# Patient Record
Sex: FEMALE | NOT HISPANIC OR LATINO | Employment: STUDENT | URBAN - METROPOLITAN AREA
[De-identification: names, ages, dates, MRNs, and addresses within clinical notes are randomized per-mention and may not be internally consistent; named-entity substitution may affect disease eponyms.]

---

## 2023-12-09 ENCOUNTER — HOSPITAL ENCOUNTER (INPATIENT)
Facility: CLINIC | Age: 28
Setting detail: SURGERY ADMIT
End: 2023-12-09
Attending: DENTIST | Admitting: DENTIST
Payer: COMMERCIAL

## 2023-12-09 ENCOUNTER — ANESTHESIA EVENT (OUTPATIENT)
Dept: SURGERY | Facility: CLINIC | Age: 28
End: 2023-12-09
Payer: COMMERCIAL

## 2023-12-09 ENCOUNTER — HOSPITAL ENCOUNTER (INPATIENT)
Facility: CLINIC | Age: 28
LOS: 2 days | Discharge: HOME | End: 2023-12-11
Attending: EMERGENCY MEDICINE | Admitting: SURGERY
Payer: COMMERCIAL

## 2023-12-09 ENCOUNTER — APPOINTMENT (OUTPATIENT)
Dept: CT IMAGING | Facility: CLINIC | Age: 28
End: 2023-12-09
Attending: EMERGENCY MEDICINE
Payer: COMMERCIAL

## 2023-12-09 DIAGNOSIS — S01.81XA CHIN LACERATION, INITIAL ENCOUNTER: ICD-10-CM

## 2023-12-09 DIAGNOSIS — S02.66XB OPEN FRACTURE OF SYMPHYSIS OF BODY OF MANDIBLE, INITIAL ENCOUNTER (H): Primary | ICD-10-CM

## 2023-12-09 PROBLEM — S02.609A MANDIBLE FRACTURE (H): Status: ACTIVE | Noted: 2023-12-09

## 2023-12-09 LAB
ABO/RH(D): NORMAL
ALBUMIN SERPL BCG-MCNC: 3.9 G/DL (ref 3.5–5.2)
ALP SERPL-CCNC: 45 U/L (ref 40–150)
ALT SERPL W P-5'-P-CCNC: 13 U/L (ref 0–50)
ANION GAP SERPL CALCULATED.3IONS-SCNC: 11 MMOL/L (ref 7–15)
ANTIBODY SCREEN: NEGATIVE
AST SERPL W P-5'-P-CCNC: 18 U/L (ref 0–45)
ATRIAL RATE - MUSE: 65 BPM
BASOPHILS # BLD AUTO: 0 10E3/UL (ref 0–0.2)
BASOPHILS NFR BLD AUTO: 1 %
BILIRUB SERPL-MCNC: 0.3 MG/DL
BUN SERPL-MCNC: 10.1 MG/DL (ref 6–20)
CALCIUM SERPL-MCNC: 8.6 MG/DL (ref 8.6–10)
CHLORIDE SERPL-SCNC: 106 MMOL/L (ref 98–107)
CREAT SERPL-MCNC: 0.99 MG/DL (ref 0.51–0.95)
DEPRECATED HCO3 PLAS-SCNC: 22 MMOL/L (ref 22–29)
DIASTOLIC BLOOD PRESSURE - MUSE: NORMAL MMHG
EGFRCR SERPLBLD CKD-EPI 2021: 79 ML/MIN/1.73M2
EOSINOPHIL # BLD AUTO: 0.2 10E3/UL (ref 0–0.7)
EOSINOPHIL NFR BLD AUTO: 3 %
ERYTHROCYTE [DISTWIDTH] IN BLOOD BY AUTOMATED COUNT: 11.8 % (ref 10–15)
GLUCOSE SERPL-MCNC: 108 MG/DL (ref 70–99)
HCT VFR BLD AUTO: 41.4 % (ref 35–47)
HGB BLD-MCNC: 14.5 G/DL (ref 11.7–15.7)
IMM GRANULOCYTES # BLD: 0 10E3/UL
IMM GRANULOCYTES NFR BLD: 0 %
INTERPRETATION ECG - MUSE: NORMAL
LYMPHOCYTES # BLD AUTO: 3.3 10E3/UL (ref 0.8–5.3)
LYMPHOCYTES NFR BLD AUTO: 54 %
MCH RBC QN AUTO: 30.8 PG (ref 26.5–33)
MCHC RBC AUTO-ENTMCNC: 35 G/DL (ref 31.5–36.5)
MCV RBC AUTO: 88 FL (ref 78–100)
MONOCYTES # BLD AUTO: 0.4 10E3/UL (ref 0–1.3)
MONOCYTES NFR BLD AUTO: 6 %
NEUTROPHILS # BLD AUTO: 2.2 10E3/UL (ref 1.6–8.3)
NEUTROPHILS NFR BLD AUTO: 36 %
NRBC # BLD AUTO: 0 10E3/UL
NRBC BLD AUTO-RTO: 0 /100
P AXIS - MUSE: 31 DEGREES
PLATELET # BLD AUTO: 267 10E3/UL (ref 150–450)
POTASSIUM SERPL-SCNC: 4.1 MMOL/L (ref 3.4–5.3)
PR INTERVAL - MUSE: 140 MS
PROT SERPL-MCNC: 6.8 G/DL (ref 6.4–8.3)
QRS DURATION - MUSE: 90 MS
QT - MUSE: 416 MS
QTC - MUSE: 432 MS
R AXIS - MUSE: 77 DEGREES
RBC # BLD AUTO: 4.71 10E6/UL (ref 3.8–5.2)
SODIUM SERPL-SCNC: 139 MMOL/L (ref 135–145)
SPECIMEN EXPIRATION DATE: NORMAL
SYSTOLIC BLOOD PRESSURE - MUSE: NORMAL MMHG
T AXIS - MUSE: 60 DEGREES
VENTRICULAR RATE- MUSE: 65 BPM
WBC # BLD AUTO: 6.2 10E3/UL (ref 4–11)

## 2023-12-09 PROCEDURE — 70450 CT HEAD/BRAIN W/O DYE: CPT | Mod: 26 | Performed by: RADIOLOGY

## 2023-12-09 PROCEDURE — 96375 TX/PRO/DX INJ NEW DRUG ADDON: CPT

## 2023-12-09 PROCEDURE — 0HQ1XZZ REPAIR FACE SKIN, EXTERNAL APPROACH: ICD-10-PCS | Performed by: DENTIST

## 2023-12-09 PROCEDURE — 120N000002 HC R&B MED SURG/OB UMMC

## 2023-12-09 PROCEDURE — 70486 CT MAXILLOFACIAL W/O DYE: CPT | Mod: 26 | Performed by: RADIOLOGY

## 2023-12-09 PROCEDURE — 70450 CT HEAD/BRAIN W/O DYE: CPT

## 2023-12-09 PROCEDURE — 93005 ELECTROCARDIOGRAM TRACING: CPT

## 2023-12-09 PROCEDURE — 85025 COMPLETE CBC W/AUTO DIFF WBC: CPT | Performed by: EMERGENCY MEDICINE

## 2023-12-09 PROCEDURE — 250N000011 HC RX IP 250 OP 636: Performed by: EMERGENCY MEDICINE

## 2023-12-09 PROCEDURE — 36415 COLL VENOUS BLD VENIPUNCTURE: CPT | Performed by: EMERGENCY MEDICINE

## 2023-12-09 PROCEDURE — 93010 ELECTROCARDIOGRAM REPORT: CPT | Performed by: EMERGENCY MEDICINE

## 2023-12-09 PROCEDURE — 250N000013 HC RX MED GY IP 250 OP 250 PS 637: Performed by: PHYSICIAN ASSISTANT

## 2023-12-09 PROCEDURE — 99285 EMERGENCY DEPT VISIT HI MDM: CPT | Mod: 25

## 2023-12-09 PROCEDURE — 250N000011 HC RX IP 250 OP 636: Performed by: PHYSICIAN ASSISTANT

## 2023-12-09 PROCEDURE — 70486 CT MAXILLOFACIAL W/O DYE: CPT

## 2023-12-09 PROCEDURE — 86850 RBC ANTIBODY SCREEN: CPT | Performed by: EMERGENCY MEDICINE

## 2023-12-09 PROCEDURE — 258N000003 HC RX IP 258 OP 636: Performed by: EMERGENCY MEDICINE

## 2023-12-09 PROCEDURE — 96361 HYDRATE IV INFUSION ADD-ON: CPT

## 2023-12-09 PROCEDURE — 86901 BLOOD TYPING SEROLOGIC RH(D): CPT | Performed by: EMERGENCY MEDICINE

## 2023-12-09 PROCEDURE — 72125 CT NECK SPINE W/O DYE: CPT | Mod: 26 | Performed by: RADIOLOGY

## 2023-12-09 PROCEDURE — 80053 COMPREHEN METABOLIC PANEL: CPT | Performed by: EMERGENCY MEDICINE

## 2023-12-09 PROCEDURE — 72125 CT NECK SPINE W/O DYE: CPT

## 2023-12-09 PROCEDURE — 96365 THER/PROPH/DIAG IV INF INIT: CPT

## 2023-12-09 PROCEDURE — 96376 TX/PRO/DX INJ SAME DRUG ADON: CPT

## 2023-12-09 PROCEDURE — 99285 EMERGENCY DEPT VISIT HI MDM: CPT | Mod: 25 | Performed by: EMERGENCY MEDICINE

## 2023-12-09 PROCEDURE — 99222 1ST HOSP IP/OBS MODERATE 55: CPT | Performed by: PHYSICIAN ASSISTANT

## 2023-12-09 RX ORDER — ACETAMINOPHEN 325 MG/1
975 TABLET ORAL 3 TIMES DAILY
Status: DISCONTINUED | OUTPATIENT
Start: 2023-12-09 | End: 2023-12-10

## 2023-12-09 RX ORDER — PROCHLORPERAZINE 25 MG
25 SUPPOSITORY, RECTAL RECTAL EVERY 12 HOURS PRN
Status: DISCONTINUED | OUTPATIENT
Start: 2023-12-09 | End: 2023-12-11 | Stop reason: HOSPADM

## 2023-12-09 RX ORDER — AMPICILLIN AND SULBACTAM 2; 1 G/1; G/1
3 INJECTION, POWDER, FOR SOLUTION INTRAMUSCULAR; INTRAVENOUS EVERY 6 HOURS
Status: DISCONTINUED | OUTPATIENT
Start: 2023-12-09 | End: 2023-12-11

## 2023-12-09 RX ORDER — CHLORHEXIDINE GLUCONATE ORAL RINSE 1.2 MG/ML
15 SOLUTION DENTAL 2 TIMES DAILY
Status: DISCONTINUED | OUTPATIENT
Start: 2023-12-09 | End: 2023-12-11 | Stop reason: HOSPADM

## 2023-12-09 RX ORDER — ONDANSETRON 2 MG/ML
4 INJECTION INTRAMUSCULAR; INTRAVENOUS ONCE
Status: COMPLETED | OUTPATIENT
Start: 2023-12-09 | End: 2023-12-09

## 2023-12-09 RX ORDER — HYDROMORPHONE HYDROCHLORIDE 1 MG/ML
0.5 INJECTION, SOLUTION INTRAMUSCULAR; INTRAVENOUS; SUBCUTANEOUS ONCE
Status: COMPLETED | OUTPATIENT
Start: 2023-12-09 | End: 2023-12-09

## 2023-12-09 RX ORDER — ONDANSETRON 2 MG/ML
4 INJECTION INTRAMUSCULAR; INTRAVENOUS EVERY 6 HOURS PRN
Status: DISCONTINUED | OUTPATIENT
Start: 2023-12-09 | End: 2023-12-11 | Stop reason: HOSPADM

## 2023-12-09 RX ORDER — PROCHLORPERAZINE MALEATE 10 MG
10 TABLET ORAL EVERY 6 HOURS PRN
Status: DISCONTINUED | OUTPATIENT
Start: 2023-12-09 | End: 2023-12-11 | Stop reason: HOSPADM

## 2023-12-09 RX ORDER — HYDROMORPHONE HCL IN WATER/PF 6 MG/30 ML
0.2 PATIENT CONTROLLED ANALGESIA SYRINGE INTRAVENOUS
Status: DISCONTINUED | OUTPATIENT
Start: 2023-12-09 | End: 2023-12-10

## 2023-12-09 RX ORDER — CEFAZOLIN SODIUM 1 G/3ML
1 INJECTION, POWDER, FOR SOLUTION INTRAMUSCULAR; INTRAVENOUS EVERY 8 HOURS
Status: DISCONTINUED | OUTPATIENT
Start: 2023-12-09 | End: 2023-12-09

## 2023-12-09 RX ORDER — LIDOCAINE HYDROCHLORIDE AND EPINEPHRINE 10; 10 MG/ML; UG/ML
5 INJECTION, SOLUTION INFILTRATION; PERINEURAL ONCE
Status: DISCONTINUED | OUTPATIENT
Start: 2023-12-09 | End: 2023-12-11 | Stop reason: HOSPADM

## 2023-12-09 RX ORDER — DROSPIRENONE AND ETHINYL ESTRADIOL 0.02-3(28)
1 KIT ORAL DAILY
COMMUNITY
Start: 2023-10-18

## 2023-12-09 RX ORDER — AMOXICILLIN 250 MG
1-2 CAPSULE ORAL
Status: DISCONTINUED | OUTPATIENT
Start: 2023-12-09 | End: 2023-12-11 | Stop reason: HOSPADM

## 2023-12-09 RX ORDER — HYDROMORPHONE HYDROCHLORIDE 1 MG/ML
0.5 INJECTION, SOLUTION INTRAMUSCULAR; INTRAVENOUS; SUBCUTANEOUS
Status: DISCONTINUED | OUTPATIENT
Start: 2023-12-09 | End: 2023-12-09

## 2023-12-09 RX ORDER — METHOCARBAMOL 750 MG/1
750 TABLET, FILM COATED ORAL 4 TIMES DAILY
Status: DISCONTINUED | OUTPATIENT
Start: 2023-12-09 | End: 2023-12-11 | Stop reason: HOSPADM

## 2023-12-09 RX ORDER — CEFAZOLIN SODIUM 1 G/3ML
1 INJECTION, POWDER, FOR SOLUTION INTRAMUSCULAR; INTRAVENOUS ONCE
Status: COMPLETED | OUTPATIENT
Start: 2023-12-09 | End: 2023-12-09

## 2023-12-09 RX ORDER — DROSPIRENONE AND ETHINYL ESTRADIOL 0.02-3(28)
1 KIT ORAL DAILY
Status: DISCONTINUED | OUTPATIENT
Start: 2023-12-09 | End: 2023-12-11 | Stop reason: HOSPADM

## 2023-12-09 RX ORDER — ONDANSETRON 4 MG/1
4 TABLET, ORALLY DISINTEGRATING ORAL EVERY 6 HOURS PRN
Status: DISCONTINUED | OUTPATIENT
Start: 2023-12-09 | End: 2023-12-11 | Stop reason: HOSPADM

## 2023-12-09 RX ADMIN — ACETAMINOPHEN 975 MG: 325 TABLET, FILM COATED ORAL at 19:42

## 2023-12-09 RX ADMIN — HYDROMORPHONE HYDROCHLORIDE 0.5 MG: 1 INJECTION, SOLUTION INTRAMUSCULAR; INTRAVENOUS; SUBCUTANEOUS at 10:14

## 2023-12-09 RX ADMIN — SODIUM CHLORIDE 1000 ML: 9 INJECTION, SOLUTION INTRAVENOUS at 08:43

## 2023-12-09 RX ADMIN — AMPICILLIN AND SULBACTAM 3 G: 1; 2 INJECTION, POWDER, FOR SOLUTION INTRAMUSCULAR; INTRAVENOUS at 16:10

## 2023-12-09 RX ADMIN — CEFAZOLIN 1 G: 1 INJECTION, POWDER, FOR SOLUTION INTRAMUSCULAR; INTRAVENOUS at 07:21

## 2023-12-09 RX ADMIN — CHLORHEXIDINE GLUCONATE 15 ML: 1.2 RINSE ORAL at 19:42

## 2023-12-09 RX ADMIN — METHOCARBAMOL 750 MG: 750 TABLET ORAL at 21:05

## 2023-12-09 RX ADMIN — ONDANSETRON 4 MG: 2 INJECTION INTRAMUSCULAR; INTRAVENOUS at 10:14

## 2023-12-09 RX ADMIN — HYDROMORPHONE HYDROCHLORIDE 0.5 MG: 1 INJECTION, SOLUTION INTRAMUSCULAR; INTRAVENOUS; SUBCUTANEOUS at 07:03

## 2023-12-09 RX ADMIN — METHOCARBAMOL 750 MG: 750 TABLET ORAL at 13:51

## 2023-12-09 RX ADMIN — AMPICILLIN AND SULBACTAM 3 G: 1; 2 INJECTION, POWDER, FOR SOLUTION INTRAMUSCULAR; INTRAVENOUS at 21:05

## 2023-12-09 RX ADMIN — DROSPIRENONE AND ETHINYL ESTRADIOL 1 TABLET: KIT at 16:10

## 2023-12-09 RX ADMIN — HYDROMORPHONE HYDROCHLORIDE 0.5 MG: 1 INJECTION, SOLUTION INTRAMUSCULAR; INTRAVENOUS; SUBCUTANEOUS at 11:47

## 2023-12-09 RX ADMIN — METHOCARBAMOL 750 MG: 750 TABLET ORAL at 18:20

## 2023-12-09 RX ADMIN — ACETAMINOPHEN 975 MG: 325 TABLET, FILM COATED ORAL at 13:52

## 2023-12-09 ASSESSMENT — ACTIVITIES OF DAILY LIVING (ADL)
ADLS_ACUITY_SCORE: 35

## 2023-12-09 ASSESSMENT — VISUAL ACUITY: OU: BASELINE

## 2023-12-09 NOTE — MEDICATION SCRIBE - ADMISSION MEDICATION HISTORY
Medication Scribe Admission Medication History    Admission medication history is complete. The information provided in this note is only as accurate as the sources available at the time of the update.    Information Source(s): Patient via in-person    Pertinent Information: Spoke with patient in person and completed medication hx. Patient states that she only takes ZAK 3-0.02MG Tab. Denies taking any other prescribed/OTC medications at this time.     Changes made to PTA medication list:  Added: ZAK 3-0.02MG Tab  Deleted: None  Changed: None    Medication Affordability:  Not including over the counter (OTC) medications, was there a time in the past 3 months when you did not take your medications as prescribed because of cost?: No    Allergies reviewed with patient and updates made in EHR: no    Medication History Completed By: Veda Prasad 12/9/2023 11:27 AM    PTA Med List   Medication Sig Last Dose    drospirenone-ethinyl estradiol (ZAK) 3-0.02 MG tablet Take 1 tablet by mouth daily 12/8/2023 at PM

## 2023-12-09 NOTE — PROGRESS NOTES
Arrived from: ED at 1330  Belongings/meds: phone, purse, wallet, pajamas, 2 necklaces, earrings, shoes, birth control in med bin for pharmacy to verify for use while here.   2 RN Skin Assessment Completed by: Mayte VERNON And Amelia SU RN    Non-intact findings documented (yes/no/NA): yes- L knee abrasion, R hand scabs, jaw lac sutured and covered.    VSS on RA. Alert and oriented x4. 5/5 througout. 6/10 pain to jaw- tylenol and robaxin given. Full liquid diet. Swallows pills whole, 1 at a time. PIV Sl'd. BS+x4, last BM 12/8. Voiding. NPO at midnight for OR in the am.

## 2023-12-09 NOTE — ED TRIAGE NOTES
Pt ambulatory to triage c/o fall on stairs with face laceration      Triage Assessment (Adult)       Row Name 12/09/23 0652          Triage Assessment    Airway WDL WDL        Respiratory WDL    Respiratory WDL WDL        Skin Circulation/Temperature WDL    Skin Circulation/Temperature WDL WDL        Cardiac WDL    Cardiac WDL WDL        Peripheral/Neurovascular WDL    Peripheral Neurovascular WDL WDL        Cognitive/Neuro/Behavioral WDL    Cognitive/Neuro/Behavioral WDL WDL

## 2023-12-09 NOTE — ED NOTES
Bacitracin applied to 4 inch suture site below chin. Site covered by folded 4x4 gauze and secured by paper tape.     Ann Sharp RN.

## 2023-12-09 NOTE — H&P
[facility]    History and Physical: Trauma Service       Date of Admission:  12/9/2023    Time of Admission/Consult Request (page/call): 1040    Time of my evaluation: 1055  Consulting services:  OMFS -    Assessment   Trauma mechanism: Trip over plant on ground   Time/date of injury: 0600 12/9/2023  Known Injuries:  Mandibular fracture      Procedure:  Planned OR 12/10/23    Neuro/Pain/Psych:  # Trip over potted plant, head strike, +LOC  - Head CT: No acute intracranial pathology.   - C-Spine CT: No acute fracture or traumatic subluxation.     # Acute traumatic pain   - Scheduled: Tylenol, robaxin  - Prn: oxycodone, dilaudid   - Ice to jaw    EENT:  # Open Comminuted mildly displaced fracture of the anterior mandibular body.  # Chin Laceration, s/p closure 12/9/23  - Keep upright as much as able to help with swelling  - OMFS: closed chin laceration, planned OR in morning       Pulmonary:  - Supplemental oxygen to keep saturation above 92 %.    Cardiovascular:    - Monitor hemodynamic status.     GI/Nutrition:    - Full Liquid Diet  - NPO at midnight     Renal/ Fluids/Electrolytes:  # Elevated Creatinine   - Creatinine: 0.99  - electrolyte replacement protocol in place.     Endocrine:  - continue PTA: OCP    Infectious disease:   # Perioperative Antibiotics for open fracture   - Received one dose of Ancef,  continue Unasyn, per discussion with surgical team  - Peridex bid      Hematology:    - Hgb: 14.5. Monitor and trend.   - Threshold for transfusion if hgb <7.0 or signs/symptoms of hypoperfusion.       Skin:  - dilgent cares to prevent skin breakdown and wound formation.      Code status: Full Code     General Cares:  GI Prophylaxis: NA  DVT Prophylaxis: pcd until post-op  Date of last stool/Bowel Regimen:in place  Pulmonary toilet: oob    ETOH: This patient was asked if in the last 3-6 months there has been a time when she had 4 or more drinks in a single  day/outing.. Patient answer to the screening question was in the negative. No intervention needed.  Primary Care Physician   Md Kelley [facility]      Plan   Admit to 6A  Surgery planned for tomorrow, npo at midnight       Mimi Boyle PA-C  Primary team: Trauma Services   Job code pager [phone] (24 hours a day)  Use AMCOM to text page (not text page compatible with [email or web address]).    Dial * * * [phone] then  [phone], wait for prompt and then enter call back number.   Do NOT call numbers listed to right in Treatment Team section.       Chief Complaint   Jaw fx    History is obtained from the patient, electronic health record, and emergency department physician    History of Present Illness   Lianna Fragoso is a 28 year old female who presented to the ED s/p fall. She is visiting friends from [place] and got up this morning around 0600 to get a glass of water in a dark room and tripped over a potted plant. She fell quickly and has amnesia surrounding the event. She was found by her  who heard the noise of her fall. She felt her jaw was misaligned and there was instant bleeding from her lower lip where her upper teeth bit down into it and her chin where there was a laceration. They came in by POV right after.       Past Medical History    I have reviewed this patient's medical history and updated it with pertinent information if needed.   No past medical history on file.    Past Surgical History   I have reviewed this patient's surgical history and updated it with pertinent information if needed.  No past surgical history on file.  Prior to Admission Medications   None     Allergies   No Known Allergies    Social History   Social History     Socioeconomic History    Marital status: Single     Spouse name: Not on file    Number of children: Not on file    Years of education: Not on file    Highest education level: Not on file   Occupational History    Not on file   Tobacco Use    Smoking status: Not on file    Smokeless  tobacco: Not on file   Substance and Sexual Activity    Alcohol use: Not on file    Drug use: Not on file    Sexual activity: Not on file   Other Topics Concern    Not on file   Social History Narrative    Not on file     Social Determinants of Health     Financial Resource Strain: Not on file   Food Insecurity: Not on file   Transportation Needs: Not on file   Physical Activity: Not on file   Stress: Not on file   Social Connections: Not on file   Interpersonal Safety: Not on file   Housing Stability: Not on file       Family History   Family history reviewed with patient and is noncontributory.    Review of Systems   CONSTITUTIONAL: No fever, chills, sweats, fatigue   EYES: no visual blurring, no double vision or visual loss  ENT: laceration to lower lip, chin  RESPIRATORY: no shortness of breath, no cough, no sputum   CARDIOVASCULAR: no palpitations, no chest  pain, no exertional chest pain or pressure  GASTROINTESTINAL: no nausea or vomiting, or abd pain  GENITOURINARY: no dysuria, no frequency or hesitancy, no hematuria  MUSCULOSKELETAL: no weakness, no redness, no swelling, no joint pain,   SKIN: lacerations  NEUROLOGIC: no numbness or tingling of hands, no numbness or tingling  of feet, no syncope, no tremors or weakness  PSYCHIATRIC: no sleep disturbances, no anxiety or depression    Physical Exam       BP: 123/82 Pulse: 94   Resp: 18 SpO2: 100 % O2 Device: None (Room air)    Vital Signs with Ranges  Pulse:  [64-94] 94  Resp:  [18] 18  BP: ()/(58-82) 123/82  SpO2:  [96 %-100 %] 100 % 0 lbs 0 oz    Primary Survey:   Airway: patient talking  Breathing: symmetric respiratory effort bilaterally  Circulation: central pulses present and peripheral pulses present  Disability: Pupils - left 4 mm and brisk, right 4 mm and brisk     Ransom Coma Scale - Total 15/15  Eye Response (E): 4  4= spontaneous,  3= to verbal/voice, 2=  to pain, 1= No response   Verbal Response (V): 5   5= Orientated, converses,  4=  Confused, converses, 3= Inappropriate words,  2= Incomprehensible sounds,  1=No response   Motor Response (M): 6   6= Obeys commands, 5= Localizes to pain, 4= Withdrawal to pain, 3=Fexion to pain, 2= Extension to pain, 1= No response    Secondary Survey:  General: alert, oriented to person, place, time  Head: normocephalic, inferior chin laceration, closed with sutures, dressing CDI  Eyes: PERRLA, pupils 4mm, EOMI, corneas and conjunctivae clear  Nose: nares patent, no drainage, nasal septum non-tender  Mouth/Throat: dental tenderness, laceration to lower lip, dried blood throughout mouth, pain with movement   Neck: Trachea midline. No midline posterior tenderness, full AROM without pain or tenderness   Chest/Pulmonary: normal respiratory rate and rhythm,  bilateral clear breath sounds,  Cardiovascular: S1, S2,  normal and regular rate and rhythm, no murmurs  Abdomen: soft, non-tender, no guarding, no rebound tenderness and no tenderness to palpation  :pelvis stable to lateral compression, no clinton, no urine assess  Back/Spine: no deformity, no midline tenderness, no sacral tenderness,  no step-offs and no abrasions or contusions  Musculoskel/Extremities: normal extremities, full AROM of major joints without tenderness, edema, erythema, ecchymosis, or abrasions.   Hand: no gross deformities of hands or fingers. Full AROM of hand and fingers in flexion and extension.  strength equal and symmetric.   Skin: skin warm and dry.   Neuro: PERRLA, alert, oriented x 3. CN II-XII grossly intact. No focal deficits. Strength 5/5 x 4 extremities.  Sensation intact.  Psychiatric: affect/mood normal, cooperative, normal judgement/insight and memory intact  # Pain Assessment:      12/9/2023     9:14 AM   Current Pain Score   Patient currently in pain? yes   - Lianna is experiencing pain due to fractures. Pain management was discussed with Lianna and her spouse and the plan was created in a collaborative fashion.  Lianna's  response to the current recommendations: engaged  - Please see the plan for pain management as documented above    Data   Results for orders placed or performed during the hospital encounter of 12/09/23 (from the past 24 hour(s))   CBC with platelets differential    Narrative    The following orders were created for panel order CBC with platelets differential.  Procedure                               Abnormality         Status                     ---------                               -----------         ------                     CBC with platelets and d...[[id number]]                      Final result                 Please view results for these tests on the individual orders.   ABO/Rh type and screen *Canceled*    Narrative    The following orders were created for panel order ABO/Rh type and screen.  Procedure                               Abnormality         Status                     ---------                               -----------         ------                     Adult Type and Screen[[id number]]                                                         Please view results for these tests on the individual orders.   Comprehensive metabolic panel   Result Value Ref Range    Sodium 139 135 - 145 mmol/L    Potassium 4.1 3.4 - 5.3 mmol/L    Carbon Dioxide (CO2) 22 22 - 29 mmol/L    Anion Gap 11 7 - 15 mmol/L    Urea Nitrogen 10.1 6.0 - 20.0 mg/dL    Creatinine 0.99 (H) 0.51 - 0.95 mg/dL    GFR Estimate 79 >60 mL/min/1.73m2    Calcium 8.6 8.6 - 10.0 mg/dL    Chloride 106 98 - 107 mmol/L    Glucose 108 (H) 70 - 99 mg/dL    Alkaline Phosphatase 45 40 - 150 U/L    AST 18 0 - 45 U/L    ALT 13 0 - 50 U/L    Protein Total 6.8 6.4 - 8.3 g/dL    Albumin 3.9 3.5 - 5.2 g/dL    Bilirubin Total 0.3 <=1.2 mg/dL   CBC with platelets and differential   Result Value Ref Range    WBC Count 6.2 4.0 - 11.0 10e3/uL    RBC Count 4.71 3.80 - 5.20 10e6/uL    Hemoglobin 14.5 11.7 - 15.7 g/dL    Hematocrit 41.4 35.0 - 47.0 %    MCV 88  78 - 100 fL    MCH 30.8 26.5 - 33.0 pg    MCHC 35.0 31.5 - 36.5 g/dL    RDW 11.8 10.0 - 15.0 %    Platelet Count 267 150 - 450 10e3/uL    % Neutrophils 36 %    % Lymphocytes 54 %    % Monocytes 6 %    % Eosinophils 3 %    % Basophils 1 %    % Immature Granulocytes 0 %    NRBCs per 100 WBC 0 <1 /100    Absolute Neutrophils 2.2 1.6 - 8.3 10e3/uL    Absolute Lymphocytes 3.3 0.8 - 5.3 10e3/uL    Absolute Monocytes 0.4 0.0 - 1.3 10e3/uL    Absolute Eosinophils 0.2 0.0 - 0.7 10e3/uL    Absolute Basophils 0.0 0.0 - 0.2 10e3/uL    Absolute Immature Granulocytes 0.0 <=0.4 10e3/uL    Absolute NRBCs 0.0 10e3/uL   EKG 12-lead, tracing only   Result Value Ref Range    Systolic Blood Pressure  mmHg    Diastolic Blood Pressure  mmHg    Ventricular Rate 65 BPM    Atrial Rate 65 BPM    MI Interval 140 ms    QRS Duration 90 ms     ms    QTc 432 ms    P Axis 31 degrees    R AXIS 77 degrees    T Axis 60 degrees    Interpretation ECG Sinus rhythm  Normal ECG      ABO/Rh type and screen *Canceled*    Narrative    The following orders were created for panel order ABO/Rh type and screen.  Procedure                               Abnormality         Status                     ---------                               -----------         ------                       Please view results for these tests on the individual orders.   CT Head w/o Contrast    Narrative    EXAM: CT HEAD W/O CONTRAST  12/9/2023 8:57 AM     HISTORY: fall, head injury       COMPARISON: None.    TECHNIQUE: Using multidetector thin collimation helical acquisition  technique, axial, coronal and sagittal CT images from the skull base  to the vertex were obtained without intravenous contrast.   (topogram) image(s) also obtained and reviewed.    FINDINGS:  No acute intracranial hemorrhage, mass effect, or midline shift. No  acute loss of gray-white matter differentiation in the cerebral  hemispheres. Ventricles are proportionate to the cerebral sulci. Clear  basal  cisterns.    The bony calvaria and the bones of the skull base are normal. The  visualized portions of the paranasal sinuses and mastoid air cells are  clear. Grossly normal orbits.       Impression    IMPRESSION: No acute intracranial pathology. .    I have personally reviewed the examination and initial interpretation  and I agree with the findings.    DAMIÁN SMITH MD         SYSTEM ID:  [id number]   CT Facial Bones without Contrast    Narrative    EXAM: CT FACIAL BONES WITHOUT CONTRAST  12/9/2023 8:58 AM     HISTORY: fall, suspect open mandible fracture       COMPARISON:   None.       TECHNIQUE: Using thin collimation multidetector helical acquisition  technique, axial, sagittal, and coronal thin section CT images were  reconstructed through the facial bones. Images were reviewed in bone  and soft tissue windows.    FINDINGS:   Comminuted and mildly displaced fractures of the anterior mandibular  body. Air densities with pain bilateral sublingual space and left   space. Mild edema within the perimandibular subcutaneous  soft tissues.    Intact cribriform plate. Orbital structures are within normal limits.  Clear paranasal sinuses and mastoid air cells.     No periapical dental lucency. Visualized soft tissues of the upper  neck, skull base, upper airway and cervical spinal structures are  within normal limits.      Impression    IMPRESSION: Comminuted mildly displaced fracture of the anterior  mandibular body.    I have personally reviewed the examination and initial interpretation  and I agree with the findings.    DAMIÁN SMITH MD         SYSTEM ID:  [id number]   Cervical spine CT w/o contrast    Narrative    EXAM: CT CERVICAL SPINE W/O CONTRAST  12/9/2023 8:58 AM     HISTORY: fall with head injury, distracting injury, fall with head  injury, distracting injury       COMPARISON: None.    TECHNIQUE: Using multidetector thin collimation helical acquisition  technique, axial, coronal and sagittal CT  images through the cervical  spine were obtained without intravenous contrast.    FINDINGS:  Mild widening of the right atlantoaxial interval, which may be  positional. Alignment is otherwise within normal limits. There is  straightening of the normal cervical lordosis. No acute fracture or  traumatic subluxation. No loss of intervertebral disc height. No  prevertebral edema.    The findings on a level by level basis are as follows:    C2-3:  No spinal canal or neural foraminal stenosis.    C3-4:  No spinal canal or neural foraminal stenosis.    C4-5:  No spinal canal or neural foraminal stenosis.    C5-6:  No spinal canal or neural foraminal stenosis.    C6-7:  No spinal canal or neural foraminal stenosis.    C7-T1:  No spinal canal or neural foraminal stenosis.     No abnormality of the paraspinous soft tissues.      Impression    IMPRESSION:  No acute fracture or traumatic subluxation.    I have personally reviewed the examination and initial interpretation  and I agree with the findings.    DAMIÁN SMITH MD         SYSTEM ID:  [id number]   ABO/Rh type and screen    Narrative    The following orders were created for panel order ABO/Rh type and screen.  Procedure                               Abnormality         Status                     ---------                               -----------         ------                     Adult Type and Screen[[id number]]                            Final result                 Please view results for these tests on the individual orders.   Adult Type and Screen   Result Value Ref Range    ABO/RH(D) O POS     Antibody Screen Negative Negative    SPECIMEN EXPIRATION DATE [id number]        Studies:  Cervical spine CT w/o contrast   Final Result   IMPRESSION:   No acute fracture or traumatic subluxation.      I have personally reviewed the examination and initial interpretation   and I agree with the findings.      DAMIÁN SMITH MD            SYSTEM ID:  [id number]      CT  Facial Bones without Contrast   Final Result   IMPRESSION: Comminuted mildly displaced fracture of the anterior   mandibular body.      I have personally reviewed the examination and initial interpretation   and I agree with the findings.      DAMIÁN SMITH MD            SYSTEM ID:  [id number]      CT Head w/o Contrast   Final Result   IMPRESSION: No acute intracranial pathology. .      I have personally reviewed the examination and initial interpretation   and I agree with the findings.      DAMIÁN SMITH MD            SYSTEM ID:  [id number]

## 2023-12-09 NOTE — ED PROVIDER NOTES
ED Provider Note  [facility]      History     Chief Complaint   Patient presents with    Fall    Facial Laceration    Facial Injury     HPI  Lianna Fragoso is a 28 year old female with no PMH who presents to the ED for chief complaint for fall.  She went to bed in her normal state of health.  She woke up to grab a glass of water.  She thinks that she tripped over a plant on the ground, and must of hit her face on the wall or the ground.  The next and she remembers is waking up to her partner calling her name asking if she was okay.  She had pain in her jaw.  She swallowed a little bit of blood, however has no difficulty with breathing.  She denies any neck pain, numbness, tingling, weakness, vision changes, headache, back pain.  She does not have a history of syncope.  There were no preemptive symptoms prior to the fall such as lightheadedness, dizziness, chest pain.  She is not on anticoagulation.    Last night she and her partner were celebrating, because she successfully defended her masters.  She had a couple of drinks of alcohol, no drug use.  She denies daily alcohol use.  Denies SI/HI.    Tetanus updated in 2021.    Past Medical History  No past medical history on file.  No past surgical history on file.  No current outpatient medications on file.    No Known Allergies  Family History  No family history on file.  Social History          A medically appropriate review of systems was performed with pertinent positives and negatives noted in the HPI, and all other systems negative.    Physical Exam   BP: 93/58  Pulse: 64  Resp: 18  SpO2: 96 %  Physical Exam  General: no acute distress.  Mildly anxious and tearful  HENT: Uneven mandibular teeth in the anterior aspect, tender.  No loose or missing teeth.  Tender left anterior mandible with approximately 4 cm gaping laceration.  Minimal blood in the posterior oropharynx.  Trachea midline.  No swelling, crepitus of the neck.  No voice  changes.  Normal swallow.  Nontender face otherwise.  No hemotympanum or signs of basilar skull fracture.  Eyes: EOMI, conjunctivae normal.  Normal voice.  Cardiovascular:  Normal rate and regular rhythm.   No murmur heard.  Radial pulses 2+ bilaterally.  Pulmonary:  No respiratory distress. Normal breath sounds bilaterally.  Abdominal: no distension.  Abdomen is soft. There is no mass. There is no abdominal tenderness.  Musculoskeletal:    Moving all extremities spontaneously.  No cyanosis, clubbing, or edema.  Nontender throughout.  Normal range of motion.  No midline spinal tenderness.  Skin: Warm, dry, and well perfused. Good turgor.  Neurological:  No focal deficit present.  A&O x 4  Psychiatric:   The patient is awake, alert.  Appropriate mood and affect.    ED Course, Procedures, & Data      Procedures            EKG Interpretation:      Interpreted by Jo Handley MD  Time reviewed: 7:40 AM  Symptoms at time of EKG: Previous syncope  Rhythm: normal sinus   Rate: normal  Axis: normal  Ectopy: none  Conduction: normal  ST Segments/ T Waves: No ST-T wave changes  Q Waves: none  Comparison to prior: No old EKG available    Clinical Impression: normal EKG                 Results for orders placed or performed during the hospital encounter of 12/09/23   CBC with platelets and differential     Status: None   Result Value Ref Range    WBC Count 6.2 4.0 - 11.0 10e3/uL    RBC Count 4.71 3.80 - 5.20 10e6/uL    Hemoglobin 14.5 11.7 - 15.7 g/dL    Hematocrit 41.4 35.0 - 47.0 %    MCV 88 78 - 100 fL    MCH 30.8 26.5 - 33.0 pg    MCHC 35.0 31.5 - 36.5 g/dL    RDW 11.8 10.0 - 15.0 %    Platelet Count 267 150 - 450 10e3/uL    % Neutrophils 36 %    % Lymphocytes 54 %    % Monocytes 6 %    % Eosinophils 3 %    % Basophils 1 %    % Immature Granulocytes 0 %    NRBCs per 100 WBC 0 <1 /100    Absolute Neutrophils 2.2 1.6 - 8.3 10e3/uL    Absolute Lymphocytes 3.3 0.8 - 5.3 10e3/uL    Absolute Monocytes 0.4 0.0 - 1.3 10e3/uL     Absolute Eosinophils 0.2 0.0 - 0.7 10e3/uL    Absolute Basophils 0.0 0.0 - 0.2 10e3/uL    Absolute Immature Granulocytes 0.0 <=0.4 10e3/uL    Absolute NRBCs 0.0 10e3/uL   CBC with platelets differential     Status: None    Narrative    The following orders were created for panel order CBC with platelets differential.  Procedure                               Abnormality         Status                     ---------                               -----------         ------                     CBC with platelets and d...[[id number]]                      Final result                 Please view results for these tests on the individual orders.   ABO/Rh type and screen     Status: None (In process)    Narrative    The following orders were created for panel order ABO/Rh type and screen.  Procedure                               Abnormality         Status                     ---------                               -----------         ------                     Adult Type and Screen[[id number]]                            In process                   Please view results for these tests on the individual orders.     Medications   ondansetron (ZOFRAN) injection 4 mg (has no administration in time range)   ceFAZolin (ANCEF) 1 g vial to attach to  ml bag for ADULT or 50 ml bag for PEDS (1 g Intravenous $New Bag 12/9/23 0721)   pharmacy alert - intermittent dosing (has no administration in time range)   HYDROmorphone (PF) (DILAUDID) injection 0.5 mg (0.5 mg Intravenous $Given 12/9/23 0703)     Labs Ordered and Resulted from Time of ED Arrival to Time of ED Departure   CBC WITH PLATELETS AND DIFFERENTIAL       Result Value    WBC Count 6.2      RBC Count 4.71      Hemoglobin 14.5      Hematocrit 41.4      MCV 88      MCH 30.8      MCHC 35.0      RDW 11.8      Platelet Count 267      % Neutrophils 36      % Lymphocytes 54      % Monocytes 6      % Eosinophils 3      % Basophils 1      % Immature Granulocytes 0      NRBCs per 100  WBC 0      Absolute Neutrophils 2.2      Absolute Lymphocytes 3.3      Absolute Monocytes 0.4      Absolute Eosinophils 0.2      Absolute Basophils 0.0      Absolute Immature Granulocytes 0.0      Absolute NRBCs 0.0     ISTAT HCG QUANTITATIVE PREGNANCY POCT   COMPREHENSIVE METABOLIC PANEL   TYPE AND SCREEN, ADULT   ABO/RH TYPE AND SCREEN     CT Head w/o Contrast    (Results Pending)   CT Facial Bones without Contrast    (Results Pending)   Cervical spine CT w/o contrast    (Results Pending)          Critical care was not performed.     Medical Decision Making  The patient's presentation was of high complexity (an acute health issue posing potential threat to life or bodily function).    The patient's evaluation involved:  review of external note(s) from 3+ sources (see separate area of note for details)  review of 3+ test result(s) ordered prior to this encounter (see separate area of note for details)  ordering and/or review of 3+ test(s) in this encounter (see separate area of note for details)  independent interpretation of testing performed by another health professional (see separate area of note for details)  discussion of management or test interpretation with another health professional (see separate area of note for details)     The patient's management necessitated for admission    Assessment & Plan    Patient arrives to the emergency department with concern for mandibular fracture after a ground-level fall at her house after tripping over a plant.  On exam, patient with airway intact, no respiratory distress.  Minimal blood of anterior oropharynx, no areas of active bleeding/hematoma.  Concern for mandibular fracture due to mandibular deformity, uneven mandibular teeth.  Laceration along left chin gaping and concerning for open mandibular fracture.  Secondary exam negative for additional traumatic abnormality.  Fluids, Zofran antibiotics and pain medication initiated.    ECG normal sinus rhythm, no  evidence of arrhythmia or ischemia.  CT head neck, spine and facial bones independently reviewed by me, concerning for comminuted, displaced anterior mandibular fracture.  OMFS contacted who placed loose sutures as this will be the access point for their open reduction internal fixation of the mandible, plan for or tomorrow morning.  N.p.o. at midnight.  Patient was admitted to the trauma service.  Patient agrees with the plan and was admitted in stable condition.    I have reviewed the nursing notes. I have reviewed the findings, diagnosis, plan and need for follow up with the patient.    New Prescriptions    No medications on file       Final diagnoses:   None       [facility] EMERGENCY DEPARTMENT  12/9/2023     Jo Handley MD  12/11/23 1089

## 2023-12-09 NOTE — CONSULTS
ORAL & MAXILLOFACIAL SURGERY (OMFS)   CONSULT    Patient: Lianna Fragoso  : 1995  MRN: [id number]  Date of Admission: 2023  Requesting Provider: Jo Handley    OMFS consulted regarding facial trauma.      Assessment   28 year old female with no significant PMH presents to the ED [place] with comminuted displaced mandible body fracture and a 4-cm chin laceration s/p mechanical fall at 6am on 2023       Plan   - Chin laceration was repaired bedside in the ED with purpose to re-open in the operating room for fracture access  - To the OR for ORIF mandible fracture and revision of chin laceration and extraction of teeth as indicated and placement of screws as indicated. Plan to the OR tomorrow 12/10/2023   - NPO for the OR starting midnight tonight   - Full liquid no chew diet   - HOB elevated > 30 degrees  - Admit to trauma, OMFS consulted (Dr. Baldev Herrmann)  - Recommend Unasyn for antibiotic  - Pain management per primary trauma team  - Appreciate remaining care per ED and trauma team    Thank you for this consult. Please contact the OMFS resident on-call with questions or concerns.    Discussed with Chief Resident, Dr. Chano Sandoval DDS   Oral & Maxillofacial Surgery, Intern  Pager: [phone]    ___________________________________________________________________      Procedure   Repair of chin laceration     Procedure:   -Repair of chin laceration      Pre-Operative Diagnosis:   Chin laceration s/p mechanical fall      Post-Operative Diagnosis: Same     RESIDENT SURGEON: Lola Sandoval DDS  ASSISTANT: Bedside nurse      ESTIMATED BLOOD LOSS: 2cc     LOCAL ANESTHESIA: 7cc total of 1% lidocaine with 1:100,000 epinephrine delivered via local infiltration     COMPLICATIONS: None      DESCRIPTION OF PROCEDURE:   After profound anesthesia, chin laceration was irrigated thoroughly with betadine 3% and saline. Deep tissue was reapproximated with 2 of 4-0 vicryl sutures. Skin was then  "reapproximated with 5-0 fast gut sutures. Hemostasis obtained. Bacitracin was placed in wound. Gauze dressing applied.   Patient tolerated the procedure well with no major concerns.     Lola Sandoval DDS  [facility] intern   Pager: [phone]        Chief complaint   \"I think I broke my jaw, I am very scared\"       History of present illness   28 year old female with no significant PMH presents to the ED [facility] with facial trauma from fall. Reports that at 6am this morning, she was in bed, got up to get water, tripped over a plant and hit her head on the wall. Reports loss of consciousness for less than 10 seconds. Normal head CT reported in the ER. Reports current pain level 6/10 at the lower jaw. Reports bite is off. Denies difficulty with swallowing or breathing. Denies fever or chills. Denies other injuries in body. Denies other consitutional symptoms.     Patient lives in [place], currently visiting some friend in the [facility] [employer]. Was celebrating her master thesis defense last night. Had a couple of drinks, no drug.     Patient was accompanied with partner Juan C during the whole interview, exam, and repair of chin laceration procedure.          Past medical history   No past medical history on file.    Past surgical history   No past surgical history on file.    PTA medications     Current Facility-Administered Medications   Medication    HYDROmorphone (PF) (DILAUDID) injection 0.5 mg    lidocaine 1% with EPINEPHrine 1:100,000 injection 5 mL    pharmacy alert - intermittent dosing     No current outpatient medications on file.                  Allergies   No Known Allergies     Family history   No family history on file.    Social history     Social History     Socioeconomic History    Marital status: Single     Spouse name: Not on file    Number of children: Not on file    Years of education: Not on file    Highest education level: Not on file   Occupational History    Not on file   Tobacco Use    Smoking status: " Not on file    Smokeless tobacco: Not on file   Substance and Sexual Activity    Alcohol use: Not on file    Drug use: Not on file    Sexual activity: Not on file   Other Topics Concern    Not on file   Social History Narrative    Not on file     Social Determinants of Health     Financial Resource Strain: Not on file   Food Insecurity: Not on file   Transportation Needs: Not on file   Physical Activity: Not on file   Stress: Not on file   Social Connections: Not on file   Interpersonal Safety: Not on file   Housing Stability: Not on file       Review of systems   10 point ROS reviewed and negative aside from listed in HPI         Objective/Physical examination   Vitals: Blood pressure 123/82, pulse 94, resp. rate 18, SpO2 100%.    GEN: WD/WN female, with mild acute distress  HEENT: NC/trauma to face at chin, EOMI, PERRL, 4 cm chin laceration below the anterior mandible with exposure of muscle, no exposure of bone on skin, mild facial edema with no induration  I/O: occlusion is changed compared to baseline, not stable or repeatable, obvious malocclusion with no teeth occluded at the left side, midline is off, SHANTELLE is 30 mm (2 fingers) which can manipulated to 40 mm (3 fingers) with pain, in mouth there is a buccal laceration extending from tooth #25 (class I mobility) to buccal vestibule no active or persistent bleeding, FOM is non distended or elevated, uvula is midline   CV: RRR, warm and well-perfused  PULM: breathing comfortably on room air  GI: Soft, ND/NT  MSK: CATHERINE, no peripheral extremity edema  NEURO: AAOx4, CN II-XII intact bilaterally  PSYCH: anxious with mild acute distress     Laboratory, pathology and radiology data     Lab results:   CBC RESULTS:   Recent Labs   Lab Test 12/09/23  0707   WBC 6.2   RBC 4.71   HGB 14.5   HCT 41.4   MCV 88   MCH 30.8   MCHC 35.0   RDW 11.8          Last Basic Metabolic Panel:  Lab Results   Component Value Date     12/09/2023      Lab Results   Component Value  Date    POTASSIUM 4.1 12/09/2023     Lab Results   Component Value Date    CHLORIDE 106 12/09/2023     Lab Results   Component Value Date    CHYNA 8.6 12/09/2023     Lab Results   Component Value Date    CO2 22 12/09/2023     Lab Results   Component Value Date    BUN 10.1 12/09/2023     Lab Results   Component Value Date    CR 0.99 12/09/2023     Lab Results   Component Value Date     12/09/2023       IMAGING RESULTS (Include outside hospital results)     Independently reviewed - comminuted displaced mandible body fracture extending from tooth #18 to tooth #30, lingual mandible plate is displaced                  CT Facial Bones without Contrast    Impression    IMPRESSION: Comminuted mildly displaced fracture of the anterior  mandibular body.    I have personally reviewed the examination and initial interpretation  and I agree with the findings.    DAMIÁN SMITH MD         SYSTEM ID:  [id number]

## 2023-12-10 ENCOUNTER — APPOINTMENT (OUTPATIENT)
Dept: CT IMAGING | Facility: CLINIC | Age: 28
End: 2023-12-10
Payer: COMMERCIAL

## 2023-12-10 ENCOUNTER — ANESTHESIA (OUTPATIENT)
Dept: SURGERY | Facility: CLINIC | Age: 28
End: 2023-12-10
Payer: COMMERCIAL

## 2023-12-10 LAB
ANION GAP SERPL CALCULATED.3IONS-SCNC: 8 MMOL/L (ref 7–15)
BUN SERPL-MCNC: 10.2 MG/DL (ref 6–20)
CALCIUM SERPL-MCNC: 8.7 MG/DL (ref 8.6–10)
CHLORIDE SERPL-SCNC: 107 MMOL/L (ref 98–107)
CREAT SERPL-MCNC: 0.88 MG/DL (ref 0.51–0.95)
DEPRECATED HCO3 PLAS-SCNC: 23 MMOL/L (ref 22–29)
EGFRCR SERPLBLD CKD-EPI 2021: >90 ML/MIN/1.73M2
ERYTHROCYTE [DISTWIDTH] IN BLOOD BY AUTOMATED COUNT: 11.9 % (ref 10–15)
GLUCOSE BLDC GLUCOMTR-MCNC: 118 MG/DL (ref 70–99)
GLUCOSE SERPL-MCNC: 100 MG/DL (ref 70–99)
HCT VFR BLD AUTO: 39.3 % (ref 35–47)
HGB BLD-MCNC: 13.3 G/DL (ref 11.7–15.7)
HOLD SPECIMEN: NORMAL
MAGNESIUM SERPL-MCNC: 1.9 MG/DL (ref 1.7–2.3)
MCH RBC QN AUTO: 30.6 PG (ref 26.5–33)
MCHC RBC AUTO-ENTMCNC: 33.8 G/DL (ref 31.5–36.5)
MCV RBC AUTO: 91 FL (ref 78–100)
PHOSPHATE SERPL-MCNC: 2.9 MG/DL (ref 2.5–4.5)
PLATELET # BLD AUTO: 220 10E3/UL (ref 150–450)
POTASSIUM SERPL-SCNC: 4.2 MMOL/L (ref 3.4–5.3)
RBC # BLD AUTO: 4.34 10E6/UL (ref 3.8–5.2)
SODIUM SERPL-SCNC: 138 MMOL/L (ref 135–145)
WBC # BLD AUTO: 9.1 10E3/UL (ref 4–11)

## 2023-12-10 PROCEDURE — 250N000009 HC RX 250: Performed by: DENTIST

## 2023-12-10 PROCEDURE — 250N000009 HC RX 250: Performed by: NURSE PRACTITIONER

## 2023-12-10 PROCEDURE — 250N000025 HC SEVOFLURANE, PER MIN: Performed by: DENTIST

## 2023-12-10 PROCEDURE — 83735 ASSAY OF MAGNESIUM: CPT | Performed by: SURGERY

## 2023-12-10 PROCEDURE — 0NST04Z REPOSITION RIGHT MANDIBLE WITH INTERNAL FIXATION DEVICE, OPEN APPROACH: ICD-10-PCS | Performed by: DENTIST

## 2023-12-10 PROCEDURE — 250N000013 HC RX MED GY IP 250 OP 250 PS 637: Performed by: PHYSICIAN ASSISTANT

## 2023-12-10 PROCEDURE — 272N000001 HC OR GENERAL SUPPLY STERILE: Performed by: DENTIST

## 2023-12-10 PROCEDURE — 258N000003 HC RX IP 258 OP 636: Performed by: NURSE PRACTITIONER

## 2023-12-10 PROCEDURE — C1713 ANCHOR/SCREW BN/BN,TIS/BN: HCPCS | Performed by: DENTIST

## 2023-12-10 PROCEDURE — 84100 ASSAY OF PHOSPHORUS: CPT | Performed by: SURGERY

## 2023-12-10 PROCEDURE — 250N000011 HC RX IP 250 OP 636: Performed by: ANESTHESIOLOGY

## 2023-12-10 PROCEDURE — 250N000011 HC RX IP 250 OP 636: Performed by: PHYSICIAN ASSISTANT

## 2023-12-10 PROCEDURE — 250N000011 HC RX IP 250 OP 636: Mod: JZ | Performed by: ANESTHESIOLOGY

## 2023-12-10 PROCEDURE — 85027 COMPLETE CBC AUTOMATED: CPT | Performed by: PHYSICIAN ASSISTANT

## 2023-12-10 PROCEDURE — 250N000013 HC RX MED GY IP 250 OP 250 PS 637: Performed by: SURGERY

## 2023-12-10 PROCEDURE — 272N000002 HC OR SUPPLY OTHER OPNP: Performed by: DENTIST

## 2023-12-10 PROCEDURE — 370N000017 HC ANESTHESIA TECHNICAL FEE, PER MIN: Performed by: DENTIST

## 2023-12-10 PROCEDURE — 250N000009 HC RX 250: Performed by: ANESTHESIOLOGY

## 2023-12-10 PROCEDURE — 82374 ASSAY BLOOD CARBON DIOXIDE: CPT | Performed by: PHYSICIAN ASSISTANT

## 2023-12-10 PROCEDURE — 250N000013 HC RX MED GY IP 250 OP 250 PS 637: Performed by: DENTIST

## 2023-12-10 PROCEDURE — 120N000002 HC R&B MED SURG/OB UMMC

## 2023-12-10 PROCEDURE — 0NSV04Z REPOSITION LEFT MANDIBLE WITH INTERNAL FIXATION DEVICE, OPEN APPROACH: ICD-10-PCS | Performed by: DENTIST

## 2023-12-10 PROCEDURE — 70486 CT MAXILLOFACIAL W/O DYE: CPT

## 2023-12-10 PROCEDURE — 710N000010 HC RECOVERY PHASE 1, LEVEL 2, PER MIN: Performed by: DENTIST

## 2023-12-10 PROCEDURE — 360N000077 HC SURGERY LEVEL 4, PER MIN: Performed by: DENTIST

## 2023-12-10 PROCEDURE — 70486 CT MAXILLOFACIAL W/O DYE: CPT | Mod: 26 | Performed by: RADIOLOGY

## 2023-12-10 PROCEDURE — 258N000003 HC RX IP 258 OP 636: Performed by: ANESTHESIOLOGY

## 2023-12-10 PROCEDURE — 999N000141 HC STATISTIC PRE-PROCEDURE NURSING ASSESSMENT: Performed by: DENTIST

## 2023-12-10 PROCEDURE — 36415 COLL VENOUS BLD VENIPUNCTURE: CPT | Performed by: PHYSICIAN ASSISTANT

## 2023-12-10 PROCEDURE — 82310 ASSAY OF CALCIUM: CPT | Performed by: PHYSICIAN ASSISTANT

## 2023-12-10 DEVICE — IMP SCR SYN MATRIX 2.0X14MM SELF TAP LOCKING  04.503.614.01: Type: IMPLANTABLE DEVICE | Site: MAXILLA | Status: FUNCTIONAL

## 2023-12-10 DEVICE — IMPLANTABLE DEVICE: Type: IMPLANTABLE DEVICE | Site: MAXILLA | Status: FUNCTIONAL

## 2023-12-10 DEVICE — IMP SCR SYN MATRIX 2.0X12MM SELF TAP LOCKING  04.503.612.01: Type: IMPLANTABLE DEVICE | Site: MAXILLA | Status: FUNCTIONAL

## 2023-12-10 DEVICE — IMP SCR SYN MATRIX 2.0X12MM SELF TAP  04.503.412.01: Type: IMPLANTABLE DEVICE | Site: MAXILLA | Status: FUNCTIONAL

## 2023-12-10 RX ORDER — GINSENG 100 MG
CAPSULE ORAL 2 TIMES DAILY
Status: DISCONTINUED | OUTPATIENT
Start: 2023-12-10 | End: 2023-12-11 | Stop reason: HOSPADM

## 2023-12-10 RX ORDER — HYDROMORPHONE HCL IN WATER/PF 6 MG/30 ML
0.2 PATIENT CONTROLLED ANALGESIA SYRINGE INTRAVENOUS EVERY 5 MIN PRN
Status: DISCONTINUED | OUTPATIENT
Start: 2023-12-10 | End: 2023-12-10 | Stop reason: HOSPADM

## 2023-12-10 RX ORDER — BUPIVACAINE HYDROCHLORIDE AND EPINEPHRINE 2.5; 5 MG/ML; UG/ML
INJECTION, SOLUTION INFILTRATION; PERINEURAL PRN
Status: DISCONTINUED | OUTPATIENT
Start: 2023-12-10 | End: 2023-12-10 | Stop reason: HOSPADM

## 2023-12-10 RX ORDER — SODIUM CHLORIDE, SODIUM LACTATE, POTASSIUM CHLORIDE, CALCIUM CHLORIDE 600; 310; 30; 20 MG/100ML; MG/100ML; MG/100ML; MG/100ML
INJECTION, SOLUTION INTRAVENOUS CONTINUOUS
Status: DISCONTINUED | OUTPATIENT
Start: 2023-12-10 | End: 2023-12-10 | Stop reason: HOSPADM

## 2023-12-10 RX ORDER — CHLORHEXIDINE GLUCONATE ORAL RINSE 1.2 MG/ML
SOLUTION DENTAL PRN
Status: DISCONTINUED | OUTPATIENT
Start: 2023-12-10 | End: 2023-12-10 | Stop reason: HOSPADM

## 2023-12-10 RX ORDER — FENTANYL CITRATE 50 UG/ML
25 INJECTION, SOLUTION INTRAMUSCULAR; INTRAVENOUS EVERY 5 MIN PRN
Status: DISCONTINUED | OUTPATIENT
Start: 2023-12-10 | End: 2023-12-10 | Stop reason: HOSPADM

## 2023-12-10 RX ORDER — ONDANSETRON 2 MG/ML
INJECTION INTRAMUSCULAR; INTRAVENOUS PRN
Status: DISCONTINUED | OUTPATIENT
Start: 2023-12-10 | End: 2023-12-10

## 2023-12-10 RX ORDER — NALOXONE HYDROCHLORIDE 0.4 MG/ML
0.2 INJECTION, SOLUTION INTRAMUSCULAR; INTRAVENOUS; SUBCUTANEOUS
Status: DISCONTINUED | OUTPATIENT
Start: 2023-12-10 | End: 2023-12-11 | Stop reason: HOSPADM

## 2023-12-10 RX ORDER — CHLORHEXIDINE GLUCONATE ORAL RINSE 1.2 MG/ML
10 SOLUTION DENTAL ONCE
Status: DISCONTINUED | OUTPATIENT
Start: 2023-12-10 | End: 2023-12-10 | Stop reason: HOSPADM

## 2023-12-10 RX ORDER — FENTANYL CITRATE 50 UG/ML
50 INJECTION, SOLUTION INTRAMUSCULAR; INTRAVENOUS EVERY 5 MIN PRN
Status: DISCONTINUED | OUTPATIENT
Start: 2023-12-10 | End: 2023-12-10 | Stop reason: HOSPADM

## 2023-12-10 RX ORDER — IBUPROFEN 100 MG/5ML
400 SUSPENSION, ORAL (FINAL DOSE FORM) ORAL EVERY 6 HOURS PRN
Status: DISCONTINUED | OUTPATIENT
Start: 2023-12-10 | End: 2023-12-11

## 2023-12-10 RX ORDER — LIDOCAINE HYDROCHLORIDE 20 MG/ML
INJECTION, SOLUTION INFILTRATION; PERINEURAL PRN
Status: DISCONTINUED | OUTPATIENT
Start: 2023-12-10 | End: 2023-12-10

## 2023-12-10 RX ORDER — HYDROMORPHONE HCL IN WATER/PF 6 MG/30 ML
0.2 PATIENT CONTROLLED ANALGESIA SYRINGE INTRAVENOUS
Status: DISCONTINUED | OUTPATIENT
Start: 2023-12-10 | End: 2023-12-11 | Stop reason: HOSPADM

## 2023-12-10 RX ORDER — NALOXONE HYDROCHLORIDE 0.4 MG/ML
0.4 INJECTION, SOLUTION INTRAMUSCULAR; INTRAVENOUS; SUBCUTANEOUS
Status: DISCONTINUED | OUTPATIENT
Start: 2023-12-10 | End: 2023-12-11 | Stop reason: HOSPADM

## 2023-12-10 RX ORDER — PROPOFOL 10 MG/ML
INJECTION, EMULSION INTRAVENOUS PRN
Status: DISCONTINUED | OUTPATIENT
Start: 2023-12-10 | End: 2023-12-10

## 2023-12-10 RX ORDER — SODIUM CHLORIDE, SODIUM LACTATE, POTASSIUM CHLORIDE, CALCIUM CHLORIDE 600; 310; 30; 20 MG/100ML; MG/100ML; MG/100ML; MG/100ML
INJECTION, SOLUTION INTRAVENOUS CONTINUOUS PRN
Status: DISCONTINUED | OUTPATIENT
Start: 2023-12-10 | End: 2023-12-10

## 2023-12-10 RX ORDER — ACETAMINOPHEN 325 MG/10.15ML
975 LIQUID ORAL 3 TIMES DAILY
Status: DISCONTINUED | OUTPATIENT
Start: 2023-12-10 | End: 2023-12-11

## 2023-12-10 RX ORDER — ONDANSETRON 4 MG/1
4 TABLET, ORALLY DISINTEGRATING ORAL EVERY 30 MIN PRN
Status: DISCONTINUED | OUTPATIENT
Start: 2023-12-10 | End: 2023-12-10 | Stop reason: HOSPADM

## 2023-12-10 RX ORDER — HYDROMORPHONE HCL IN WATER/PF 6 MG/30 ML
0.4 PATIENT CONTROLLED ANALGESIA SYRINGE INTRAVENOUS EVERY 5 MIN PRN
Status: DISCONTINUED | OUTPATIENT
Start: 2023-12-10 | End: 2023-12-10 | Stop reason: HOSPADM

## 2023-12-10 RX ORDER — ESMOLOL HYDROCHLORIDE 10 MG/ML
INJECTION INTRAVENOUS PRN
Status: DISCONTINUED | OUTPATIENT
Start: 2023-12-10 | End: 2023-12-10

## 2023-12-10 RX ORDER — FENTANYL CITRATE 50 UG/ML
INJECTION, SOLUTION INTRAMUSCULAR; INTRAVENOUS PRN
Status: DISCONTINUED | OUTPATIENT
Start: 2023-12-10 | End: 2023-12-10

## 2023-12-10 RX ORDER — ONDANSETRON 2 MG/ML
4 INJECTION INTRAMUSCULAR; INTRAVENOUS EVERY 30 MIN PRN
Status: DISCONTINUED | OUTPATIENT
Start: 2023-12-10 | End: 2023-12-10 | Stop reason: HOSPADM

## 2023-12-10 RX ORDER — DEXAMETHASONE SODIUM PHOSPHATE 4 MG/ML
INJECTION, SOLUTION INTRA-ARTICULAR; INTRALESIONAL; INTRAMUSCULAR; INTRAVENOUS; SOFT TISSUE PRN
Status: DISCONTINUED | OUTPATIENT
Start: 2023-12-10 | End: 2023-12-10

## 2023-12-10 RX ADMIN — LIDOCAINE HYDROCHLORIDE 60 MG: 20 INJECTION, SOLUTION INFILTRATION; PERINEURAL at 12:53

## 2023-12-10 RX ADMIN — AMPICILLIN AND SULBACTAM 3 G: 1; 2 INJECTION, POWDER, FOR SOLUTION INTRAMUSCULAR; INTRAVENOUS at 03:58

## 2023-12-10 RX ADMIN — FENTANYL CITRATE 50 MCG: 50 INJECTION INTRAMUSCULAR; INTRAVENOUS at 14:53

## 2023-12-10 RX ADMIN — SODIUM CHLORIDE, POTASSIUM CHLORIDE, SODIUM LACTATE AND CALCIUM CHLORIDE: 600; 310; 30; 20 INJECTION, SOLUTION INTRAVENOUS at 15:33

## 2023-12-10 RX ADMIN — CHLORHEXIDINE GLUCONATE 15 ML: 1.2 RINSE ORAL at 07:59

## 2023-12-10 RX ADMIN — ESMOLOL HYDROCHLORIDE 60 MG: 10 INJECTION, SOLUTION INTRAVENOUS at 12:53

## 2023-12-10 RX ADMIN — FENTANYL CITRATE 50 MCG: 50 INJECTION INTRAMUSCULAR; INTRAVENOUS at 13:43

## 2023-12-10 RX ADMIN — AMPICILLIN AND SULBACTAM 1.5 G: 1; 2 INJECTION, POWDER, FOR SOLUTION INTRAMUSCULAR; INTRAVENOUS at 15:02

## 2023-12-10 RX ADMIN — DEXTROSE AND SODIUM CHLORIDE: 5; 900 INJECTION, SOLUTION INTRAVENOUS at 09:11

## 2023-12-10 RX ADMIN — DEXTROSE AND SODIUM CHLORIDE: 5; 900 INJECTION, SOLUTION INTRAVENOUS at 22:31

## 2023-12-10 RX ADMIN — HYDROMORPHONE HYDROCHLORIDE 0.4 MG: 0.2 INJECTION, SOLUTION INTRAMUSCULAR; INTRAVENOUS; SUBCUTANEOUS at 16:48

## 2023-12-10 RX ADMIN — AMPICILLIN AND SULBACTAM 3 G: 1; 2 INJECTION, POWDER, FOR SOLUTION INTRAMUSCULAR; INTRAVENOUS at 13:02

## 2023-12-10 RX ADMIN — AMPICILLIN AND SULBACTAM 3 G: 1; 2 INJECTION, POWDER, FOR SOLUTION INTRAMUSCULAR; INTRAVENOUS at 20:55

## 2023-12-10 RX ADMIN — FENTANYL CITRATE 100 MCG: 50 INJECTION INTRAMUSCULAR; INTRAVENOUS at 13:00

## 2023-12-10 RX ADMIN — CHLORHEXIDINE GLUCONATE 15 ML: 1.2 RINSE ORAL at 20:31

## 2023-12-10 RX ADMIN — Medication 10 MG: at 13:57

## 2023-12-10 RX ADMIN — HYDROMORPHONE HYDROCHLORIDE 0.4 MG: 0.2 INJECTION, SOLUTION INTRAMUSCULAR; INTRAVENOUS; SUBCUTANEOUS at 16:58

## 2023-12-10 RX ADMIN — SUGAMMADEX 200 MG: 100 INJECTION, SOLUTION INTRAVENOUS at 16:05

## 2023-12-10 RX ADMIN — Medication 10 MG: at 15:12

## 2023-12-10 RX ADMIN — MIDAZOLAM 2 MG: 1 INJECTION INTRAMUSCULAR; INTRAVENOUS at 12:47

## 2023-12-10 RX ADMIN — HYDROMORPHONE HYDROCHLORIDE 0.4 MG: 0.2 INJECTION, SOLUTION INTRAMUSCULAR; INTRAVENOUS; SUBCUTANEOUS at 16:41

## 2023-12-10 RX ADMIN — BACITRACIN: 500 OINTMENT TOPICAL at 20:31

## 2023-12-10 RX ADMIN — PROPOFOL 120 MG: 10 INJECTION, EMULSION INTRAVENOUS at 12:53

## 2023-12-10 RX ADMIN — ONDANSETRON 4 MG: 2 INJECTION INTRAMUSCULAR; INTRAVENOUS at 15:33

## 2023-12-10 RX ADMIN — Medication 50 MG: at 12:53

## 2023-12-10 RX ADMIN — Medication 20 MG: at 13:43

## 2023-12-10 RX ADMIN — DEXAMETHASONE SODIUM PHOSPHATE 6 MG: 4 INJECTION, SOLUTION INTRA-ARTICULAR; INTRALESIONAL; INTRAMUSCULAR; INTRAVENOUS; SOFT TISSUE at 12:53

## 2023-12-10 RX ADMIN — METHOCARBAMOL 750 MG: 750 TABLET ORAL at 07:58

## 2023-12-10 RX ADMIN — ACETAMINOPHEN 975 MG: 325 SOLUTION ORAL at 20:55

## 2023-12-10 RX ADMIN — ACETAMINOPHEN 975 MG: 325 TABLET, FILM COATED ORAL at 07:58

## 2023-12-10 RX ADMIN — Medication 10 MG: at 14:42

## 2023-12-10 RX ADMIN — SODIUM CHLORIDE, POTASSIUM CHLORIDE, SODIUM LACTATE AND CALCIUM CHLORIDE: 600; 310; 30; 20 INJECTION, SOLUTION INTRAVENOUS at 12:47

## 2023-12-10 RX ADMIN — HYDROMORPHONE HYDROCHLORIDE 0.5 MG: 1 INJECTION, SOLUTION INTRAMUSCULAR; INTRAVENOUS; SUBCUTANEOUS at 15:32

## 2023-12-10 ASSESSMENT — ACTIVITIES OF DAILY LIVING (ADL)
ADLS_ACUITY_SCORE: 24
ADLS_ACUITY_SCORE: 24
ADLS_ACUITY_SCORE: 18
ADLS_ACUITY_SCORE: 24
ADLS_ACUITY_SCORE: 18
ADLS_ACUITY_SCORE: 24
ADLS_ACUITY_SCORE: 18
ADLS_ACUITY_SCORE: 18
ADLS_ACUITY_SCORE: 24
ADLS_ACUITY_SCORE: 24

## 2023-12-10 NOTE — PROGRESS NOTES
Patient left for OR at 11am, report given to preop RN. Family/ with pt. Will continue to monitor and assess.

## 2023-12-10 NOTE — PROGRESS NOTES
[facility]  Trauma Service Tertiary Note    Date of Service (when I saw the patient): 12/10/2023     Assessment & Plan   Trauma mechanism: Mechanical Fall  Time/date of injury: 0600 12/9/2023  Known Injuries:  Mandibular fracture  Chin laceration     Procedure:  12/12/2023: ORIF mandible     Neuro/Pain/Psych:  # Fall head strike, +LOC   - Head CT: No acute intracranial pathology.   - C-Spine CT: No acute fracture or traumatic subluxation.      # Acute traumatic pain   - Scheduled: Tylenol, robaxin  - Prn: oxycodone, dilaudid   - Ice to jaw     EENT:  # Open Comminuted mildly displaced fracture of the anterior mandibular body.  # Chin Laceration, s/p closure 12/9/23  - Keep upright as much as able to help with swelling  - OMFS: closed chin laceration, planned OR in morning   - Unasyn ordered   - Peridex solution BID     Pulmonary:  - Supplemental oxygen to keep saturation above 92 %.     Cardiovascular:    No acute issues     GI/Nutrition:    NPO pending surgery today, MIVF ordered     Renal/ Fluids/Electrolytes:  - electrolyte replacement protocol in place.      Endocrine:  - continue PTA: OCP     Infectious disease:   # Perioperative Antibiotics for open fracture   - Unasyn, for open fracture     Hematology:    - Hgb: 14.5- >13.3, no acute plan  - Threshold for transfusion if hgb <7.0 or signs/symptoms of hypoperfusion.        Code status: Full Code     General Cares:    PPI/H2 blocker:  n/a   DVT prophylaxis: Mechanical   Bowel Regimen/Date of last stool: PTA   Pulmonary toilet: cough and deep breath   ETOH screen completed yes   Lines / drains:PIV    Expected D/C date: Pending post op course    Interval History   Planned OR today for ORIF of the mandible.  Was seen postop in the PACU, noted swelling of the lower lip, breathing comfortably, denies nausea  ROS x 8 negative with exception of those things listed in interval hx    Physical Exam   Temp: 98.1  F (36.7  C)  Temp src: Axillary BP: (!) 141/86 Pulse: 94   Resp: 15 SpO2: 100 % O2 Device: None (Room air)    Vitals:    12/09/23 1331   Weight: 58.8 kg (129 lb 10.1 oz)     Vital Signs with Ranges  Temp:  [97.3  F (36.3  C)-98.3  F (36.8  C)] 98.1  F (36.7  C)  Pulse:  [67-94] 94  Resp:  [15-16] 15  BP: (115-141)/(73-86) 141/86  SpO2:  [98 %-100 %] 100 %  No intake/output data recorded.    [place] Coma Scale - Total 15/15    Constitutional: Awake, slightly sleepy  Eyes: Lids and lashes normal, pupils equal, round and reactive to light, extra ocular muscles intact, sclera clear, conjunctiva normal.  ENT: Normocephalic, atraumatic, notable swelling to the lower lips, chin incision CDI  Respiratory: No increased work of breathing, good air exchange, clear to auscultation bilaterally, no crackles or wheezing.  Cardiovascular:  regular rate and rhythm, normal S1 and S2  GI: Normal bowel sounds, abdomen soft, non-distended, non-tender, no guarding  Genitourinary:  not seen  Skin:  Chin laceration, moderate swelling and ecchymosis to the chin  Musculoskeletal: There is no redness, warmth, or swelling of the joints.  Pedal pulse palpated.  Neurologic: Awake, alert, oriented. No focal deficits.  Neuropsychiatric: Calm, normal eye contact    DELMY Zavaleta CNP  To contact the trauma service use job code pager [phone],   Numeric texts or alpha text through [facility]

## 2023-12-10 NOTE — PLAN OF CARE
Status: admitted 12/9 from a fall resulting in mandibular fracture.  Vitals: VSS  Neuros: intact  IV: PIV TKO between IV abx  Labs/Electrolytes: WNL  Resp/trach: WNL on RA  Diet: NPO since 0000, otherwise full liquid diet   Bowel status: BM 12/8  : voiding spont  Skin: chin laceration with gauze, CDI. Abrasion to R hand and L knee. Lower lip w/ abrasion from fall.   Pain: 3/10 jaw pain, declined interventions NOC  Activity: up ad carolin  Social:  at bedside   Plan: plan for OR today, not scheduled. Will need CHG wipes prior to OR.      Goal Outcome Evaluation:      Plan of Care Reviewed With: patient    Overall Patient Progress: no change    Outcome Evaluation: Plan for OR today

## 2023-12-10 NOTE — ANESTHESIA PROCEDURE NOTES
Airway       Patient location during procedure: OR       Procedure Start/Stop Times: 12/10/2023 12:59 PM  Staff -        CRNA: Pan Motta APRN CRNA       Performed By: CRNA  Consent for Airway        Urgency: elective  Indications and Patient Condition       Indications for airway management: olga-procedural       Induction type:intravenous       Mask difficulty assessment: 1 - vent by mask    Final Airway Details       Final airway type: endotracheal airway       Successful airway: ETT - single, Nasal and ANTHONY  Endotracheal Airway Details        ETT size (mm): 6.5       Cuffed: yes       Successful intubation technique: direct laryngoscopy       DL Blade Type: Taylor 2       Grade View of Cords: 1       Adjucts: stylet and magill forceps       Position: Right       Measured from: lips       Bite block used: None    Post intubation assessment        Placement verified by: capnometry, equal breath sounds and chest rise        Number of attempts at approach: 1       Number of other approaches attempted: 0       Secured with: plastic tape       Ease of procedure: easy       Dentition: Intact and Unchanged    Medication(s) Administered   Medication Administration Time: 12/10/2023 12:59 PM

## 2023-12-10 NOTE — ANESTHESIA CARE TRANSFER NOTE
Patient: Lianna Fragoso    Procedure: Procedure(s):  Open reduction internal fixation mandible       Diagnosis: Mandible fracture (H) [S02.609A]  Diagnosis Additional Information: No value filed.    Anesthesia Type:   No value filed.     Note:    Oropharynx: oropharynx clear of all foreign objects  Level of Consciousness: awake  Oxygen Supplementation: nasal cannula  Level of Supplemental Oxygen (L/min / FiO2): 3  Independent Airway: airway patency satisfactory and stable  Dentition: dentition unchanged  Vital Signs Stable: post-procedure vital signs reviewed and stable  Report to RN Given: handoff report given  Patient transferred to: PACU    Handoff Report: Identifed the Patient, Identified the Reponsible Provider, Reviewed the pertinent medical history, Discussed the surgical course, Reviewed Intra-OP anesthesia mangement and issues during anesthesia, Set expectations for post-procedure period and Allowed opportunity for questions and acknowledgement of understanding    Vitals:  Vitals Value Taken Time   /97 12/10/23 1619   Temp     Pulse 85 12/10/23 1623   Resp 14 12/10/23 1623   SpO2 100 % 12/10/23 1623   Vitals shown include unfiled device data.    Electronically Signed By: DELMY Galan CRNA  December 10, 2023  4:24 PM

## 2023-12-10 NOTE — PROGRESS NOTES
Brief OMFS Note     Patient is s/p ORIF anterior mandible fracture and repair of chin laceration on 12/10/2023       Postoperative recommendations  - No additional surgical interventions planned from OMFS perspective at this time  - OMFS will continue to follow  - Continue IV abx while inpatient, unasyn 3g Q6h with transition to Augmentin 875/125 BID for 7 days on discharge  - HOB>30  - Ice packs with jaw bra  - Peridex rinses BID  - Full liquid no chew diet x 6 weeks after surgery   - Pain per primary, it is okay for NSAIDS  - It is okay to suction gently in mouth, okay to have bloody secretions from mouth   - It is okay to see dark red blood from nose from intubation. Please page OMFS on call if there is persistent bleeding from nose or mouth.   - Wound on chin currently has prolene sutures (non-resorbable) in place. Bacitracin on wound on chin x first 3 days, followed by vaseline until wound is healed      Please contact OMFS resident on first call with questions or concerns     Lola Sandoval DDS   OMFS intern   Pager: [phone]

## 2023-12-10 NOTE — BRIEF OP NOTE
[facility]    Brief Operative Note    Pre-operative diagnosis: Mandible fracture (H) [S02.609A]  Post-operative diagnosis Same as pre-operative diagnosis    Procedure: Open reduction internal fixation mandible, N/A - Jaw    Surgeon: Surgeon(s) and Role:     * Baldev Herrmann, NIMESHS - Primary     * Chano Longo DDS - Resident - Assisting     * Lola Sandoval DDS - Resident - Assisting     * Awilda Sahu DDS - Resident - Assisting  Anesthesia: General   Estimated Blood Loss: Less than 100 ml    Drains: None  Specimens: * No specimens in log *  Findings:   None.  Complications: None.  Implants:   Implant Name Type Inv. Item Serial No.  Lot No. LRB No. Used Action   PLATE BN TLL TI 10 HL MATRIXWAVE NS [id number] - SNA Metallic Hardware/[place] PLATE BN TLL TI 10 HL MATRIXWAVE NS [id number] NA [facility] [facility]- NA N/A 1 Implanted and Explanted   IMP SYN SCR MATRIXWAVE MMF 8X1.8MM TI [id number] - SNA Metallic Hardware/[place] IMP SYN SCR MATRIXWAVE MMF 8X1.8MM TI [phone] NA SYNTHES-STRATEC NA N/A 6 Implanted and Explanted   2.0mm titanium matrix mandible screws, self tapping   NA  NA N/A 4 Wasted   IMP SCR SYN MATRIX 2.0X12MM SELF TAP  [phone] - [id number] Metallic Hardware/[place] IMP SCR SYN MATRIX 2.0X12MM SELF TAP  [phone]  SYNTHES-STRATEC  N/A 1 Implanted   IMP SCR SYN MATRIX 2.0X14MM SELF TAP  [phone] - [id number] Metallic Hardware/[place] IMP SCR SYN MATRIX 2.0X14MM SELF TAP  [phone]  SYNTHES-STRATEC  N/A 1 Wasted   IMP SCR SYN MATRIX 2.0X16MM SELF TAP  [phone] - [id number] Metallic Hardware/[place] IMP SCR SYN MATRIX 2.0X16MM SELF TAP  [phone]  SYNTHES-STRATEC  N/A 1 Implanted   IMP SCR SYN MATRIX 2.0X18MM SELF TAP  [phone] - [id number] Metallic Hardware/[place] IMP SCR SYN MATRIX 2.0X18MM SELF TAP  [phone]  [facility]  N/A 2 Implanted   IMP SCR SYN MATRIX 2.0X12MM SELF TAP LOCKING  [phone] -  [id number] Metallic Hardware/[place] IMP SCR SYN MATRIX 2.0X12MM SELF TAP LOCKING  [phone]  SYNTHES-STRATEC  N/A 4 Implanted   IMP SCR SYN MATRIX 2.0X14MM SELF TAP LOCKING  [phone] - [id number] Metallic Hardware/[place] IMP SCR SYN MATRIX 2.0X14MM SELF TAP LOCKING  [id number]  SYNTHES-STRATEC  N/A 4 Implanted   IMP PLATE SYN MATRIX BROAD MALLEABLE 1.0MM 2X2H  04.503.750 - [id number] Metallic Hardware/[place] IMP PLATE SYN MATRIX BROAD MALLEABLE 1.0MM 2X2H  04.503.750  SYNTHES-STRATEC  N/A 1 Wasted   IMP PLATE SYN MATRIX 2.0MM 20H  04.503.730 - [id number] Metallic Hardware/[place] IMP PLATE SYN MATRIX 2.0MM 20H  04.503.730  SYNTHES-STRATEC  N/A 1 Implanted

## 2023-12-10 NOTE — OP NOTE
Oral and Maxillofacial Surgery  Operative Note    Preoperative Diagnosis  Pre-Op Diagnosis Codes:  Comminuted bilateral mandibular body fracture  Comminuted mandibular symphysis fracture     Postoperative Diagnosis  Same as preop     Procedure(s):  Open reduction internal fixation bilateral mandibular body, mandibular symphysis fractures  Intraoperative maxillomandibular fixation     Surgeon:  Baldev Herrmann DDS, MD, FACS       Resident Surgeon(s):  PAPITO Waddell DDS Trang Nguyen, DDS     EBL:   50 mL     Drains: None     Prosthetic Devices:   Implant Name Type Inv. Item Serial No.  Lot No. LRB No. Used Action   IMP SCR SYN MATRIX 2.0X12MM SELF TAP  [phone] - SNA Metallic Hardware/[place] IMP SCR SYN MATRIX 2.0X12MM SELF TAP  [phone] [facility] N/A 1 Implanted   IMP SCR SYN MATRIX 2.0X16MM SELF TAP  [phone] - SNA Metallic Hardware/[place] IMP SCR SYN MATRIX 2.0X16MM SELF TAP  [phone] NA [facility] NA N/A 1 Implanted   IMP SCR SYN MATRIX 2.0X18MM SELF TAP  [id number] - SNA Metallic Hardware/[place] IMP SCR SYN MATRIX 2.0X18MM SELF TAP  [id number] NA SYNTHES-STRATEC NA N/A 2 Implanted   IMP SCR SYN MATRIX 2.0X12MM SELF TAP LOCKING  [phone] - SNA Metallic Hardware/[place] IMP SCR SYN MATRIX 2.0X12MM SELF TAP LOCKING  [phone] NA SYNTHES-STRATEC NA N/A 4 Implanted   IMP SCR SYN MATRIX 2.0X14MM SELF TAP LOCKING  [phone] - SNA Metallic Hardware/[place] IMP SCR SYN MATRIX 2.0X14MM SELF TAP LOCKING  [phone] NA SYNTHES-STRATEC NA N/A 4 Implanted   IMP PLATE SYN MATRIX 2.0MM 20H  04.503.730 - SNA Metallic Hardware/[place] IMP PLATE SYN MATRIX 2.0MM 20H  04.503.730 NA SYNTHES-STRATEC NA N/A 1 Implanted        Specimen Removed:   * No specimens in log *     Complications: none     Indications for Surgery:   Lianna Fragoso is a 28 year old female who fell down stairs on 12/9/23 resulting in comminuted fractures of her mandibular  symphysis and bilateral bodies. She endorsed malocclusion. ORIF of the fractures was recommended to the patient. The risks, benefits, alternatives including but not limited to no treatment, bleeding, bruising, swelling, infection, damage to adjacent structures, need for additional procedures were discussed with the patient preoperatively.     Procedure description:   The patient was seen by myself and Dr. Herrmann in the pre-op holding area.  The procedure, benefits, risks, alternatives including no treatment were discussed again with the patient and an informed written consent was obtained for the planned procedure.  Patient was transported to the operating room and transferred to the OR table in a supine position.  Airway was secured via nasal tube. A throat pack was placed and the mouth was prepped with chlorhexidine. 10ml 0.25% marcaine w/ epi was administered. The patient was prepped and draped in a sterile fashion for the planned procedure A surgical timeout was performed by all members of the team.     Attention turned to the submental laceration.  The existing sutures were removed.  Monopolar cautery was used to extend the incision down to bone.  The displaced lingual fragment was mobilized and clamped to the anterior mandible    Attention was then turned to the maxilla. A Synthes Wave Plate was measured from first molar to first molar and secured with associated screws x7.    Attention was then turned to the mandible.  An Zach arch bar was placed from first molar to first molar and secured with 24-gauge wire posteriorly and 26-gauge wire anteriorly.  The patient was placed in maxillomandibular fixation using 26-gauge wire.  Monopolar cautery was used for a vestibular incision from canine to canine down to bone.  A periosteal elevator was used to reflect subperiosteally to the inferior border of the mandible and over the mental nerve bilaterally, the incision was then extended while protecting the mental  nerves bilaterally.   The comminuted anterior mandible fracture was reduced with a 4-hole malleable miniplate with 4 associated monocortical screws.  The floating lingual portion of bone was secured to the anterior mandible using a bicortical position screw.  A  14 hole 2.0 reconstruction plate was then bent to the inferior border of the mandible encompassing the bilateral body fractures and symphysis fracture.  The reconstruction plate was secured to the mandible with bicortical screws x11. During this process, the previously placed malleable miniplate was removed with its for associated screws. All osteotomies were performed with electric drill and copious irrigation.  The patient was then taken out of maxillomandibular fixation and found to have bilateral stable occlusion. The mental nerves were noted to be intact. The surgical site was copiously irrigated with normal saline.  The mentalis was closed with 3-0 Vicryl suture.  The mucosa was closed with 3-0 Chromic Gut suture in continuous interlocking fashion.  The maxillary Synthes wave plate was then removed with 7 associated screws.  The submental surgical wound was then copiously irrigated and closed with 4-0 vicryl suture in deep buried fashion for deep dermals. The skin was closed with 5-0 prolene sutures in continuous fashion      The throat pack was removed and oral cavity suctioned. The patient was turned over to the anesthesia service. The patient was extubated and transferred to PACU in stable condition.    I was told by the OR nurse staff that all needles, sponges, instruments were found to be correct and there were no intraoperative complications noted.     Attending staff was present for the entire duration of the procedure.     Chano Longo DDS  Oral & Maxillofacial Surgery PGY 4

## 2023-12-10 NOTE — PLAN OF CARE
Status: Admitted 12/9 for comminuted displaced mandible body fracture and 4-cm chin laceration s/p fall at 6am on 12/9   Vitals: VSS on RA   Neuros: Intact   IV: PIV SL   Labs/Electrolytes: WDL   Resp/trach: LSC   Diet: Full liquid, good PO intake. NPO at midnight    Bowel status: LBM 12/8. BS+   : Voiding spontaneously   Skin: Chin laceration with primapore CDI, abrasion to R hand and L knee    Pain: Managed with scheduled tylenol and robaxin   Activity: Independent   Social: SO at bedside, spending the night   Plan: OR tomorrow for mandible fracture repair and revision of chin laceration   Updates this shift: CHG shower complete

## 2023-12-11 VITALS
HEART RATE: 67 BPM | OXYGEN SATURATION: 100 % | RESPIRATION RATE: 16 BRPM | SYSTOLIC BLOOD PRESSURE: 123 MMHG | WEIGHT: 129.63 LBS | DIASTOLIC BLOOD PRESSURE: 83 MMHG | TEMPERATURE: 98.2 F

## 2023-12-11 LAB
ANION GAP SERPL CALCULATED.3IONS-SCNC: 9 MMOL/L (ref 7–15)
BUN SERPL-MCNC: 5.4 MG/DL (ref 6–20)
CALCIUM SERPL-MCNC: 8.1 MG/DL (ref 8.6–10)
CHLORIDE SERPL-SCNC: 107 MMOL/L (ref 98–107)
CREAT SERPL-MCNC: 0.69 MG/DL (ref 0.51–0.95)
DEPRECATED HCO3 PLAS-SCNC: 22 MMOL/L (ref 22–29)
EGFRCR SERPLBLD CKD-EPI 2021: >90 ML/MIN/1.73M2
ERYTHROCYTE [DISTWIDTH] IN BLOOD BY AUTOMATED COUNT: 11.7 % (ref 10–15)
GLUCOSE SERPL-MCNC: 114 MG/DL (ref 70–99)
HCT VFR BLD AUTO: 36.3 % (ref 35–47)
HGB BLD-MCNC: 12.3 G/DL (ref 11.7–15.7)
MCH RBC QN AUTO: 31 PG (ref 26.5–33)
MCHC RBC AUTO-ENTMCNC: 33.9 G/DL (ref 31.5–36.5)
MCV RBC AUTO: 91 FL (ref 78–100)
PLATELET # BLD AUTO: 197 10E3/UL (ref 150–450)
POTASSIUM SERPL-SCNC: 3.9 MMOL/L (ref 3.4–5.3)
RBC # BLD AUTO: 3.97 10E6/UL (ref 3.8–5.2)
SODIUM SERPL-SCNC: 138 MMOL/L (ref 135–145)
WBC # BLD AUTO: 10.4 10E3/UL (ref 4–11)

## 2023-12-11 PROCEDURE — 82310 ASSAY OF CALCIUM: CPT | Performed by: PHYSICIAN ASSISTANT

## 2023-12-11 PROCEDURE — 250N000011 HC RX IP 250 OP 636: Mod: JZ | Performed by: PHYSICIAN ASSISTANT

## 2023-12-11 PROCEDURE — 250N000013 HC RX MED GY IP 250 OP 250 PS 637: Performed by: PHYSICIAN ASSISTANT

## 2023-12-11 PROCEDURE — 85027 COMPLETE CBC AUTOMATED: CPT | Performed by: PHYSICIAN ASSISTANT

## 2023-12-11 PROCEDURE — 250N000013 HC RX MED GY IP 250 OP 250 PS 637: Performed by: SURGERY

## 2023-12-11 PROCEDURE — 36415 COLL VENOUS BLD VENIPUNCTURE: CPT | Performed by: PHYSICIAN ASSISTANT

## 2023-12-11 PROCEDURE — 99239 HOSP IP/OBS DSCHRG MGMT >30: CPT | Performed by: NURSE PRACTITIONER

## 2023-12-11 RX ORDER — ACETAMINOPHEN 325 MG/1
650 TABLET ORAL EVERY 6 HOURS PRN
COMMUNITY
Start: 2023-12-11

## 2023-12-11 RX ORDER — AMOXICILLIN AND CLAVULANATE POTASSIUM 400; 57 MG/5ML; MG/5ML
875 POWDER, FOR SUSPENSION ORAL 2 TIMES DAILY
Status: DISCONTINUED | OUTPATIENT
Start: 2023-12-11 | End: 2023-12-11

## 2023-12-11 RX ORDER — IBUPROFEN 200 MG
400 TABLET ORAL EVERY 6 HOURS PRN
Status: DISCONTINUED | OUTPATIENT
Start: 2023-12-11 | End: 2023-12-11 | Stop reason: HOSPADM

## 2023-12-11 RX ORDER — ACETAMINOPHEN 325 MG/1
975 TABLET ORAL 3 TIMES DAILY
Status: DISCONTINUED | OUTPATIENT
Start: 2023-12-11 | End: 2023-12-11 | Stop reason: HOSPADM

## 2023-12-11 RX ORDER — GINSENG 100 MG
CAPSULE ORAL 2 TIMES DAILY
Qty: 14 G | Refills: 0 | Status: SHIPPED | OUTPATIENT
Start: 2023-12-11 | End: 2023-12-13

## 2023-12-11 RX ORDER — CHLORHEXIDINE GLUCONATE ORAL RINSE 1.2 MG/ML
15 SOLUTION DENTAL 2 TIMES DAILY
Qty: 118 ML | Refills: 0 | Status: SHIPPED | OUTPATIENT
Start: 2023-12-11

## 2023-12-11 RX ORDER — METHOCARBAMOL 750 MG/1
750 TABLET, FILM COATED ORAL 4 TIMES DAILY
Qty: 20 TABLET | Refills: 0 | Status: SHIPPED | OUTPATIENT
Start: 2023-12-11 | End: 2023-12-16

## 2023-12-11 RX ORDER — IBUPROFEN 400 MG/1
400 TABLET, FILM COATED ORAL EVERY 6 HOURS PRN
COMMUNITY
Start: 2023-12-11

## 2023-12-11 RX ADMIN — AMPICILLIN AND SULBACTAM 3 G: 1; 2 INJECTION, POWDER, FOR SOLUTION INTRAMUSCULAR; INTRAVENOUS at 08:49

## 2023-12-11 RX ADMIN — BACITRACIN: 500 OINTMENT TOPICAL at 07:59

## 2023-12-11 RX ADMIN — ACETAMINOPHEN 975 MG: 325 TABLET, FILM COATED ORAL at 08:48

## 2023-12-11 RX ADMIN — CHLORHEXIDINE GLUCONATE 15 ML: 1.2 RINSE ORAL at 07:52

## 2023-12-11 RX ADMIN — METHOCARBAMOL 750 MG: 750 TABLET ORAL at 07:52

## 2023-12-11 RX ADMIN — AMPICILLIN AND SULBACTAM 3 G: 1; 2 INJECTION, POWDER, FOR SOLUTION INTRAMUSCULAR; INTRAVENOUS at 03:59

## 2023-12-11 ASSESSMENT — ACTIVITIES OF DAILY LIVING (ADL)
ADLS_ACUITY_SCORE: 24
ADLS_ACUITY_SCORE: 24
ADLS_ACUITY_SCORE: 21
ADLS_ACUITY_SCORE: 24

## 2023-12-11 ASSESSMENT — VISUAL ACUITY: OU: NORMAL ACUITY

## 2023-12-11 NOTE — PLAN OF CARE
Discharge time/date: 12/11 at 1200  Walked or Wheelchair: Walked to lobby with boyfriend   PIV removed: Yes  Reviewed AVS with patient: Yes  Medication due times added to AVS in EPIC: Yes  Verbalized understanding of discharge with teachback: Yes  Medications retrieved from pharmacy: Yes  Supplies sent home: Yes, spare jaw bras and gauze for covering incision along with bacitracin and aquaphor   Belongings from security with patient: No belongings in security    Speech Therapy      Visit Type: Treatment  -  Swallow  Reason for consult: Aspiration event, recent extubation    Precautions     - Medical precautions:  swallowing precautions; standard precautions.      - Oxygen: nasal cannula (1L).      - Lines in chart and on patient reviewed; cautions maintained through out session    - Safety measures: bed alarm    - Cognition:         • Expression is verbal.          • Following commands intact.      - Affect/Behavior: alert and impulsive    Current Status:     - Diet: honey-thick liquids and ground-minced/dysphagia 2      - Dentition: some missing dentition    - Pt able to feed self however requires physical A for safety  SUBJECTIVE  Pt is a 57 year old female admitted to [facility] on 1/15/2023 for sepsis.Past medical history significant for bipolar 1 disorder, COPD, chronic hepatitis-C, PTSD, depression and anxiety. Patient was recently admitted to [facility] in December for approximately 2 weeks secondary to sepsis for medial thigh abscess and ARDS. Hospitalization complicated by cardiac arrest precipitated by aspiration of scrambled eggs, intubated 1/3 to 1/4 and Bronchoscopy done. She recovered and she left hospital AMA.       Patient presented to [facility] ER on 01/15/23 after family called for welfare check and found patient to be confused on the ground covered in stool.  She was admitted to the floor for sepsis. Speech therapy was consulted and recommended Soft-chopped for dentition diet with thin liquids and Constant Supervision with meals due to impulsivity and reduced safety.  Code blue was called the morning of 1/22/23 for as patient was found pulseless and unresponsive in her bed after eating unattended.  Patient was last noted to be eating Jell-O, contents of which were present in the oropharynx.  CPR was initiated and she was intubated by anesthesia and transferred to ICU.       Pertinent Medical History:  1/16/23: Clinical evaluation completed with recommendation for  Soft-chopped/Thin liquids and Constant Supervision for safety.  1/18/23: Discharged from skilled ST on Soft-Chopped/Thin liquid diet with continued recommendation for Constant Supervision for safety.  1/22/23-Code blue called for cardiopulmonary arrest. Pt last seen eating unattended. Massive aspiration event suspected, CPR initiated, pt intubated and transferred to ICU.  1/22/23-Bronch  1/30/23- Extubated over bronch  1/30/23: RN reports emesis evening of 1/30/23 1/31/23: Clinical swallow evaluation in AM recommended VFSS    1/31/23: VFSS-recommended HTL via TSP/Dysphagia 2 solids.    2/6/23: Pt transferred to ICU for hypercapnia. NPO    2/7/23: VFSS completed, recommended resuming HTL via TSP/Dysphagia 2 solids, crushed meds, constant supervision. Physical A to feed.    2/9/23: Pt transferred to floor. Safe swallow guidelines re-issued. Afebrile, on RA.    2/10/23: Pt received upright. Afebrile, on 2L/min via NC. Voice continues to improve with regards to quality and loudness. Pt is agreeable to participate.    2/12/23: Pt had emesis while on BiPAP. Suspect aspiration. RN reports rhonchi in R lung base and mid lung.     2/13/23: Pt is afebrile, on 1L/min via NC. Continues with frequent requests for more food. RN reports pt is refusing to be fed, however does allow close supervision with constant verbal cues. Pt continues to be impulsive.    Patient agreed to participate in therapy this date.   Patient/family goals: return to previous functional status     OBJECTIVE     Oral Mechanism   Oral Motor Assessment: intact  Saliva Control: intact    Swallowing     Consistencies:  Honey Thick Liquid (teaspoon):    - Oral: impaired, impaired bolus control   - Pharyngeal: intact  Honey Thick Liquid (cup):    - Oral: impaired, impaired bolus control   - Pharyngeal: intact  Dysphagia 1/Puree:     - Pharyngeal: intact  Dysphagia 2/Ground Meat:    - Oral: impaired, impaired bolus control   - Pharyngeal: intact  Feeding  recommended and presentation of all liquids via TSP due to impulsivity.               ASSESSMENT  Emphasis of session included dysphagia intervention and therapeutic trials.     Received upright, alert and agreeable to participate. Pt continues with poor safety awareness and reduced oral awareness as evidenced by talking while masticating. Presented with trials of HTL via TSP and cup and Dysphagia 2 solids. Rapid mastication with solids. Pt is able to clear dysphagia 2 solids from oral cavity sufficiently given cued liquid wash.  Pharyngeal phase is remarkable for timely appearing pharyngeal swallow trigger and tolerance of all trials presented without clinical indication of laryngeal pen/aspiration.  Pt demonstrates improved vocal quality and strength of respiratory protective mechanisms. Plan for repeat VFSS on 2/14/23 to assess for ability to safely advance diet.    Recommend: Continue HTL via TSP/Dysphagia 2 solids, crushed meds in puree, Constant Supervision, Physical A to feed.         • Rehab Potential: fair    Education:   - Present and ready to learn: patient  Education provided during session:  - dysphagia and role of SLP    Patient at end of session:    - location: in chair    - safety measures: alarm system in place/re-engaged    - hand off to: nurse    PLAN  Plan for next session: dysphagia intervention, repeat VFSS    Frequency: x4-5/week, dysphagia     Discharge Recommendations  SLP Identified Barriers to Discharge: medical  Recommendation for Discharge Support: SLP WI: Other (comment) (TBD pending functional progress)     Interventions:  Clinical swallow evaluation, assess tolerance of least restrictive oral diet, patient/family education, reassess swallow function as appropriate, video swallow, repeat video swallow, dysphagia therapy and diet advancement trials    Plan/Goal Agreement:  Patient agrees with goals and individualized plan of care    RECOMMENDATIONS     -Diet:           *ground-minced/dysphagia 2 and honey-thick liquids (LIQUIDS BY TSP)    -Medication Administration:         *crushed and with puree    -Feeding Guidelines:          *constant supervision, no talking while eating, distraction free environment, sit fully upright for all po intake, sit up for 30 minutes after meal, small bites/sips, alternate solids/liquids and eat slowly only when alert (Verbal cues required continually for safety. If pt is non-complaint with verbal cues, Full A to feed is recommended)   -Instrumental Assessments:         *Videofluoroscopic swallow study (VFSS)    -Speech Reviewed Swallow:         *with patient/family and with clinical caregivers  -Additional recommendations: Reviewed diet recommendation and safe swallow strategies with pt and RN. RN provided safe swallow guideline sheet to post at bedside.    GOALS  Review Date: 2/14/2023  Long Term Goals:   1.  Patient will consume Ground/Minced/Dysphagia 2, Nectar-Thick Liquids diet with optimum safety and efficiency of swallow function with less than 10% signs/symptoms of aspiration. (Met, 2/9/23)    2.  Patient will tolerate advancement to baseline diet of Mechanical Soft , Thin Liquids with optimum safety and efficiency of swallow function with less than 10% signs/symptoms of overt aspiration.    3.  Patient will follow swallowing guidelines with min cues.     Documented in the chart in the following areas: Prior Living. Pain. Assessment. Patient education flowsheet      Therapy procedure time and total treatment time can be found documented on the Time Entry flowsheet

## 2023-12-11 NOTE — PROGRESS NOTES
ORAL & MAXILLOFACIAL SURGERY PROGRESS NOTE  Name: Lianna Fragoso MRN: [id number]  : 1995  Date: 2023    ASSESSMENT:  28 year old female non contributory pmh.  she was admitted on 2023 with comminuted and displaced fracutures of the bilateral mandibular parasymphysis and body.    PLAN/RECOMMENDATIONS:  - no further surgical intervention planned at this time  - pain management per primary,   - IV Unasyn 3g Q6h, transition to PO Augmentin 875 BID  - Peridex rinse BID  - HOB elevated  - ice to face PRN  or jaw bra with ice for comfort  - ok to suction mouth  - strict soft no chew diet  - change dressing when saturated or QD  - ok to discharge from FS perspective  - patient to be scheduled for outpatient followup in [facility] 7th floor [facility] on 23    The patient's case was discussed with the Chief Resident, who discussed with staff surgeon, Baldev Herrmann MD, NIMESHS     Awilda Sahu DDS  [facility] PGY-2    ______________________________________________________________________________________________________________    Interval:      NAEON, patient resting comfortably in bed and able to make needs known, endorses adequate pain control, At the time of exam, patient denies constitutional symptoms including; fever, chest pain, shortness of breath, fevers, chills.          OBJECTIVE/PHYSICAL EXAMINATION:  Vitals: Blood pressure 123/83, pulse 67, temperature 98.2  F (36.8  C), temperature source Axillary, resp. rate 16, weight 58.8 kg (129 lb 10.1 oz), SpO2 100%.  Constitutional: WD/WN, alert, and cooperative  Head: normocephalic and significant bilateral swelling of the lower facial 1/3 and lower lip, chin laceration (3 cm) closed, hemostatic, sutures intact  Eyes: PERRL, sclera white, no periorbital edema or ecchymosis, and EOMI  Ears: external pinna appear normal  Nose: external ala normal  I/O: fully dentate, FOM not elevated, no active drainage or purulence appreciated, patient denies foul  taste, and intraoral wounds/incisions closed and hemostatic, no dehiscence of hardware noted, simultaneous anterior and bilateral posterior contact, and no blood in saliva  Neck: soft, supple, no lymphadenopathy  Cardiovascular: warm, well perfused, not cyanotic  Pulmonary: breathing comfortably on room air, no active respiratory distress, and no SOB  Musculoskeletal: CATHERINE  Neurologic: A&O x 4 and CN II-XII grossly intact with exception of CNV3 anesthesia bilaterally      LABORATORY, PATHOLOGY, AND RADIOLOGY DATA:    CBC:  Recent Labs   Lab 12/11/23  0504 12/10/23  0739 12/09/23  0707   WBC 10.4 9.1 6.2   RBC 3.97 4.34 4.71   HGB 12.3 13.3 14.5   HCT 36.3 39.3 41.4   MCV 91 91 88   MCH 31.0 30.6 30.8   MCHC 33.9 33.8 35.0   RDW 11.7 11.9 11.8    220 267       CMP:  Recent Labs   Lab 12/11/23  0504 12/10/23  0915 12/10/23  0739 12/09/23  0707     --  138 139   POTASSIUM 3.9  --  4.2 4.1   CHLORIDE 107  --  107 106   CO2 22  --  23 22   ANIONGAP 9  --  8 11   * 118* 100* 108*   BUN 5.4*  --  10.2 10.1   CR 0.69  --  0.88 0.99*   GFRESTIMATED >90  --  >90 79   CHYNA 8.1*  --  8.7 8.6   MAG  --   --  1.9  --    PHOS  --   --  2.9  --    PROTTOTAL  --   --   --  6.8   ALBUMIN  --   --   --  3.9   BILITOTAL  --   --   --  0.3   ALKPHOS  --   --   --  45   AST  --   --   --  18   ALT  --   --   --  13     Imaging:  Facial Bones CT w/o Contrast 11/10/23 Independently reviewed:  Findings consistent with previous Facial Bones CT, Bone fragments appear well reduced with internal and intraoral hardware in place.

## 2023-12-11 NOTE — PROGRESS NOTES
Patient returned from PACU, around 1740, walked from cart to bed without any dizziness. On Oxygen 2Lnc, OVSS. Lower lip swollen, pt stating her chin feels numb, but sensation on lips/checks intact. Has moderate bloody drainage from mouth, used soft red orestes suction cath. Pt c/o mild pain, appears groggy/sleepy. Ice packs applied, bra sling in place. Pt has not voided, but will try later per pt. Family () on the bedside. Pt will going to CT soon. Will continue to monitor and assess.

## 2023-12-11 NOTE — PROGRESS NOTES
Care Management Follow Up    Length of Stay (days): 2    Expected Discharge Date: 12/11/2023     Concerns to be Addressed:   Financial/Insurance concerns    Patient plan of care discussed at interdisciplinary rounds: Yes    Anticipated Discharge Disposition: Home     Anticipated Discharge Services:  Home  Anticipated Discharge DME:   Not applicable at this time     Patient/family educated on Medicare website which has current facility and service quality ratings:  Not applicable at this time   Education Provided on the Discharge Plan:  yes  Patient/Family in Agreement with the Plan:  yes    Referrals Placed by CM/SW:  Not applicable at this time   Private pay costs discussed: insurance costs co-pays and deductibles    Additional Information:  SW informed by pt's floor nurse (Donna) that pt is now discharging to home but wanted to talk with the [employer] Worker before she leaves.  Reportedly neither of the [employer] Counselors (Gill Mcleod) have phoned pt or met with pt in person.  SW phoned both Harsha and Gill ([employer] Counselors) and received voice mail.  CAREY phoned the [employer] Counselor Supervisior, Génesis Valenzuela and updated .  Per discussion with Génesis, there are no financial programs available to assist with co-pays and deductibles for this pt who is temporarily residing in [place] and planning to return to her home in [place].  Per Génesis, pt can ask the vendors (when billed) if she can set up payment plans.    CAREY updated pt who voiced understanding.    SHIMA Welsh  Social Work, 6A  Phone:  [phone]  Pager:  [phone]  12/11/2023       BHUPINDER Palacio

## 2023-12-11 NOTE — PLAN OF CARE
Status: Admitted 12/9 for a fall resulting in a mandibular fracture. S/p ORIF anterior mandible fracture and repair of chin laceration on 12/10  Vitals: VSS on RA.   Neuros: A&Ox4  IV: PIV infusing D5% and NS @ 75 mL/hr.   Resp/trach: WNL  Diet: Full liquid  Bowel status: LBM 12/8  : Voiding spontaneously  Skin: Moderate swelling on lower lip w/ abrasion, chin incision. Abrasion to R hand and L knee.   Pain: Moderate pain, was unable to take pills. Tylenol was switched to liquid but patient would like to try the pills again in the morning.   Activity: Up independent.  Social:  at beside. Supportive and helpful.   Plan: Continue with current POC.

## 2023-12-11 NOTE — DISCHARGE SUMMARY
[facility]    Discharge Summary  Trauma Surgery Service    Date of Admission:  12/9/2023  Date of Discharge:  12/11/2023  Attending Physician: Dr. Cain Church  Discharging Provider: Akash Sen CNP  Date of Service (when I saw the patient): 12/11/23    Primary Provider: [facility]  Primary Care clinic: [address] [place] [address]  Phone: [phone]  Fax number: [phone]     Discharge Diagnoses   Open fracture of symphysis of body of mandible, initial encounter (H)  Chin laceration, initial encounter    Hospital Course   Lianna Fragoso is a 28 year old female who sustained a injuries to the mandible and a chin laceration due to a mechanical fall on 12/09/2023. She tripped over a potted plant in a dark room and fell. She was amnesic to the event.    Lianna was seen and evaluated by the Oral surgical and trauma teams. She underwent initial closure of the chin laceration on 12/09/2023 and subsequently an ORIF of the mandible and laceration revision and repair on 12/10/2023.  Of note her jaw was not wire shut.  She was discharged home on post op day 1 with the following post op care and follow up plan:    Pain control: Tylenol and Ibuprofen as needed. Ok to use jaw bra with ice as needed for pain and comfort.  Antibiotics: Transitioned from Unasyn to Augmentin for a total of 10 days  Orals cares: Keep the head of bed elevated, OK to suction the oral cavity as needed, continue Peridex oral rinses twice daily  Diet: Maintain full liquid, no chew diet   Follow up: recommended at the Oral Maxillofacial surgery Clinic on the [address] [place] [address] in 1 week    Significant Results and Procedures   DATE: 12/10/2023  Procedure(s):  Open reduction internal fixation mandible  Surgeon(s): Surgeon(s) and Role:     * Baldev Herrmann DDS - Primary     * Chano Longo DDS - Resident - Assisting     * Lola Sandoval DDS - Resident - Assisting     * Luis Alberto  PAPITO Kaiser - Resident - Assisting  Non-operative procedures None performed    Code Status   Full Code    SUBJECTIVE: Prior to discharge Lianna reported adequate pain control and was able to tolerate a liquid diet. The discharge post op care and follow up plan was discussed with her and her significant other. Anticipate lower lip swelling and numbness discussed.    Physical Exam   Temp: 98.2  F (36.8  C) Temp src: Axillary BP: 123/83 Pulse: 67   Resp: 16 SpO2: 100 % O2 Device: None (Room air) Oxygen Delivery: 2 LPM  Vitals:    12/09/23 1331   Weight: 58.8 kg (129 lb 10.1 oz)     Vital Signs with Ranges  Temp:  [97.5  F (36.4  C)-98.6  F (37  C)] 98.2  F (36.8  C)  Pulse:  [] 67  Resp:  [12-16] 16  BP: (123-141)/(80-98) 123/83  SpO2:  [98 %-100 %] 100 %  I/O last 3 completed shifts:  In: 1500 [I.V.:1500]  Out: 927 [Urine:877; Blood:50]    Constitutional: Awake, alert, cooperative, no apparent distress  ENT: Normocephalic,   Moderate swelling and scant bloody drainage noted from the oral cavity  Chin laceration is well approximated with scant serous drainage  Respiratory: No increased work of breathing, good air exchange, clear to auscultation bilaterally, no crackles or wheezing.  Cardiovascular: Normal apical impulse, regular rate and rhythm, normal S1 and S2  GI: Normal bowel sounds, soft, non-distended, non-tender  Genitourinary:  Voids spont  Skin: anticipate bruising and swelling to the chin   Musculoskeletal: There is no redness, warmth, or swelling of the joints.  Full range of motion noted.  Motor strength is 5 out of 5 all extremities bilaterally.  Tone is normal.  Neurologic: Awake, alert, oriented to name, place and time. No focal deficits.  Neuropsychiatric: Calm, normal eye contact, alert    Discharge Disposition   Discharged to home  Condition at discharge: Stable  Discharge VS: Blood pressure 123/83, pulse 67, temperature 98.2  F (36.8  C), temperature source Axillary, resp. rate 16, weight 58.8  "kg (129 lb 10.1 oz), SpO2 100%.    Consultations This Hospital Stay   SOCIAL WORK IP CONSULT  CARE MANAGEMENT / SOCIAL WORK IP CONSULT    Discharge Orders      Reason for your hospital stay    Mandible fracture  Post op Open reduction and internal fixation of Mandible and chin laceration closure 12/10/2023     Activity    Your activity upon discharge: activity as tolerated     Adult [facility] Follow-up and recommended labs and tests    Follow up with your primary care provider for continued medical care and hospital follow up in 5-10 days.    Oral and Maxillofacial Surgery in 1 week for post surgical follow up care   [address] [facility]  [address] [place] [id number]  Appointments: [phone]    You have been involved in a recent trauma incident resulting in an injury.  Studies show us that people affected by trauma have higher levels of post-traumatic stress disorder (PTSD) and/or depressive symptoms during the year following an injury.     Please consider the following.  Have you:  Had migraines about the event(s) or thought about the event(s) when you didn't want to?  Tried hard not to think about the event(s) or went out of your way to avoid situations that reminded you of the event(s)?  Been constantly on guard, watchful, or easily startled?  Felt numb or detached from people, activities, or your surroundings?   Felt guilty or unable to stop blaming yourself or others for the event(s) or any problems the event (s) may have caused?    If you answered \"yes\" to 3 or more of these questions, or if you simply want to discuss any of your feelings further, we recommend that you talk with your Primary Care Provider or a mental health professional.        Appointments on [place] [facility]or [facility] (with [facility] provider or service). Call [phone] if you haven't heard regarding these appointments within 7 days of discharge.     When to contact your care team    Call the Oral surgery " Clinic  if you have any of the following: temperature greater than 101.5, increased bloody or foul smelly drainage, increased swelling, or increased pain.     Wound care and dressings    Instructions to care for your wound at home: keep wound clean and dry.  - Do not submerge in water or scrub the incision.  - Ok to shower  - Apply dressing only if draining, otherwise apply bacitracin ointment to the chin incision for the next 2 days follow by Aquaphor ointment thereafter.  - Your surgical team will let you know when the sutures can be removed     Diet    Follow this diet upon discharge: Full liquid  NO CHEW diet for 6 weeks     Discharge Medications   Current Discharge Medication List        START taking these medications    Details   acetaminophen (TYLENOL) 325 MG tablet Take 2 tablets (650 mg) by mouth every 6 hours as needed for mild pain    Associated Diagnoses: Open fracture of symphysis of body of mandible, initial encounter (H)      amoxicillin-clavulanate (AUGMENTIN) 875-125 MG tablet Take 1 tablet by mouth 2 times daily for 7 days  Qty: 14 tablet, Refills: 0    Associated Diagnoses: Open fracture of symphysis of body of mandible, initial encounter (H)      bacitracin 500 UNIT/GM OINT Apply topically 2 times daily for 2 days  Qty: 14 g, Refills: 0    Associated Diagnoses: Chin laceration, initial encounter      chlorhexidine (PERIDEX) 0.12 % solution Swish and spit 15 mLs in mouth 2 times daily  Qty: 118 mL, Refills: 0    Associated Diagnoses: Open fracture of symphysis of body of mandible, initial encounter (H)      ibuprofen (ADVIL/MOTRIN) 400 MG tablet Take 1 tablet (400 mg) by mouth every 6 hours as needed for inflammatory pain    Associated Diagnoses: Open fracture of symphysis of body of mandible, initial encounter (H)      methocarbamol (ROBAXIN) 750 MG tablet Take 1 tablet (750 mg) by mouth 4 times daily for 5 days  Qty: 20 tablet, Refills: 0    Associated Diagnoses: Open fracture of symphysis of  body of mandible, initial encounter (H)           CONTINUE these medications which have NOT CHANGED    Details   drospirenone-ethinyl estradiol (ZAK) 3-0.02 MG tablet Take 1 tablet by mouth daily           Allergies   No Known Allergies  Data   Most Recent 3 CBC's:  Recent Labs   Lab Test 12/11/23  0504 12/10/23  0739 12/09/23  0707   WBC 10.4 9.1 6.2   HGB 12.3 13.3 14.5   MCV 91 91 88    220 267      Most Recent 3 BMP's:  Recent Labs   Lab Test 12/11/23  0504 12/10/23  0915 12/10/23  0739 12/09/23  0707     --  138 139   POTASSIUM 3.9  --  4.2 4.1   CHLORIDE 107  --  107 106   CO2 22  --  23 22   BUN 5.4*  --  10.2 10.1   CR 0.69  --  0.88 0.99*   ANIONGAP 9  --  8 11   CHYNA 8.1*  --  8.7 8.6   * 118* 100* 108*     Most Recent 2 LFT's:  Recent Labs   Lab Test 12/09/23  0707   AST 18   ALT 13   ALKPHOS 45   BILITOTAL 0.3     Most Recent INR's and Anticoagulation Dosing History:  Anticoagulation Dose History           No data to display              Most Recent 3 Troponin's:No lab results found.  Most Recent 6 Bacteria Isolates From Any Culture (See EPIC Reports for Culture Details):No lab results found.  Most Recent TSH, T4 and A1c Labs:No lab results found.  Results for orders placed or performed during the hospital encounter of 12/09/23   CT Head w/o Contrast    Narrative    EXAM: CT HEAD W/O CONTRAST  12/9/2023 8:57 AM     HISTORY: fall, head injury       COMPARISON: None.    TECHNIQUE: Using multidetector thin collimation helical acquisition  technique, axial, coronal and sagittal CT images from the skull base  to the vertex were obtained without intravenous contrast.   (topogram) image(s) also obtained and reviewed.    FINDINGS:  No acute intracranial hemorrhage, mass effect, or midline shift. No  acute loss of gray-white matter differentiation in the cerebral  hemispheres. Ventricles are proportionate to the cerebral sulci. Clear  basal cisterns.    The bony calvaria and the bones of  the skull base are normal. The  visualized portions of the paranasal sinuses and mastoid air cells are  clear. Grossly normal orbits.       Impression    IMPRESSION: No acute intracranial pathology. .    I have personally reviewed the examination and initial interpretation  and I agree with the findings.    DAMIÁN SMITH MD         SYSTEM ID:  [id number]   CT Facial Bones without Contrast    Narrative    EXAM: CT FACIAL BONES WITHOUT CONTRAST  12/9/2023 8:58 AM     HISTORY: fall, suspect open mandible fracture       COMPARISON:   None.       TECHNIQUE: Using thin collimation multidetector helical acquisition  technique, axial, sagittal, and coronal thin section CT images were  reconstructed through the facial bones. Images were reviewed in bone  and soft tissue windows.    FINDINGS:   Comminuted and mildly displaced fractures of the anterior mandibular  body. Air densities with pain bilateral sublingual space and left   space. Mild edema within the perimandibular subcutaneous  soft tissues.    Intact cribriform plate. Orbital structures are within normal limits.  Clear paranasal sinuses and mastoid air cells.     No periapical dental lucency. Visualized soft tissues of the upper  neck, skull base, upper airway and cervical spinal structures are  within normal limits.      Impression    IMPRESSION: Comminuted mildly displaced fracture of the anterior  mandibular body.    I have personally reviewed the examination and initial interpretation  and I agree with the findings.    DAMIÁN SMITH MD         SYSTEM ID:  [id number]   Cervical spine CT w/o contrast    Narrative    EXAM: CT CERVICAL SPINE W/O CONTRAST  12/9/2023 8:58 AM     HISTORY: fall with head injury, distracting injury, fall with head  injury, distracting injury       COMPARISON: None.    TECHNIQUE: Using multidetector thin collimation helical acquisition  technique, axial, coronal and sagittal CT images through the cervical  spine were obtained  without intravenous contrast.    FINDINGS:  Mild widening of the right atlantoaxial interval, which may be  positional. Alignment is otherwise within normal limits. There is  straightening of the normal cervical lordosis. No acute fracture or  traumatic subluxation. No loss of intervertebral disc height. No  prevertebral edema.    The findings on a level by level basis are as follows:    C2-3:  No spinal canal or neural foraminal stenosis.    C3-4:  No spinal canal or neural foraminal stenosis.    C4-5:  No spinal canal or neural foraminal stenosis.    C5-6:  No spinal canal or neural foraminal stenosis.    C6-7:  No spinal canal or neural foraminal stenosis.    C7-T1:  No spinal canal or neural foraminal stenosis.     No abnormality of the paraspinous soft tissues.      Impression    IMPRESSION:  No acute fracture or traumatic subluxation.    I have personally reviewed the examination and initial interpretation  and I agree with the findings.    DAMIÁN SMITH MD         SYSTEM ID:  [id number]   CT Facial Bones without Contrast    Narrative    EXAM: CT FACIAL BONES WITHOUT CONTRAST  12/10/2023 7:43 PM     HISTORY:  Now s/p ORIF b/l mandibular body and symphysis. Please make  3d recon       COMPARISON: CT facial bone 12/9/2023, head CT 12/9/2023.      TECHNIQUE: Using thin collimation multidetector helical acquisition  technique, axial, sagittal, and coronal thin section CT images were  reconstructed through the facial bones. Images were reviewed in bone  and soft tissue windows.    FINDINGS:   Soft tissue swelling over the anterior aspect of the mandible. Small  foci of air in the subcutaneous soft tissues of the mandible and  submandibular region. Post surgical changes of open reduction and  internal fixation of nondisplaced comminuted mandibular fracture with  multiple surgical pins and overall stable, satisfactory alignment of  the mandible. Remainder of visualized osseous structures appear  intact. Intact  cribriform plate. Orbital structures are within normal  limits. Mild mucosal thickening in the floor of the maxillary sinuses.  Remaining paranasal sinuses and mastoid air cells are clear.     No periapical dental lucency. Visualized soft tissues of the skull  base, upper airway, and cervical spinal structures are within normal  limits.      Impression    IMPRESSION:   Postoperative changes of internal fixation of comminuted mandibular  fracture demonstrating appropriate alignment. Small foci of air in the  premandibular and submandibular soft tissues likely related to  procedure.    I have personally reviewed the examination and initial interpretation  and I agree with the findings.    DAMIÁN SMITH MD         SYSTEM ID:  [id number]       Time Spent on this Encounter   I, DELMY Zavaleta CNP, personally saw the patient today and spent greater than 30 minutes discharging this patient.    We appreciate the opportunity to care for your patient while in the hospital.  Should you have any questions about their injuries or this discharge summary our contact information is below.    Trauma Services  [facility]   Department of Critical Care and Acute Care Surgery  [address] [place] [address]  Office: [phone]

## 2023-12-11 NOTE — CONSULTS
Care Management Initial Consult    General Information  Assessment completed with: Patient (Significant other (Juan C) was also present in the room), Pt and Significant other (Juan C) was also present in the room  Type of CM/SW Visit: Initial Assessment    Primary Care Provider verified and updated as needed:  (Pt states that she does not have a primary care physican and does not want to establish one in the [place] as she will be returning to [facility] the end of January/beginning of February 2024.  Pt states that she see's a GYN annually.)   Readmission within the last 30 days: no previous admission in last 30 days      Reason for Consult:  (Financial/Insurance concerns)  Advance Care Planning:    Pt does not have a health care directive       Communication Assessment  Patient's communication style: spoken language (English or Bilingual)    Hearing Difficulty or Deaf: no   Wear Glasses or Blind: no    Cognitive  Cognitive/Neuro/Behavioral: .WDL except, speech  Level of Consciousness: alert  Arousal Level: opens eyes spontaneously  Orientation: person, place, time, situation  Mood/Behavior: calm, cooperative  Best Language: 0 - No aphasia  Speech: slurred    Living Environment:   People in home:  (Pt and roommate)     Current living Arrangements:  (Pt rents a room in a house)      Able to return to prior arrangements: yes       Family/Social Support:  Care provided by: self  Provides care for: no one  Marital Status: Single  Significant Other, Sibling(s), Parent(s)          Description of Support System: Supportive    Support Assessment: Adequate family and caregiver support    Current Resources:   Patient receiving home care services: No     Community Resources:    Equipment currently used at home: none  Supplies currently used at home:      Employment/Financial:  Employment Status:  (Pt is not currently employed, 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  No    Lifestyle & Psychosocial Needs:  Social Determinants of Health     Food Insecurity: Not on file   Depression: Not on file   Housing Stability: Not on file   Tobacco Use: Not on file   Financial Resource Strain: Not on file   Alcohol Use: Not on file   Transportation Needs: Not on file   Physical Activity: Not on file   Interpersonal Safety: Not on file   Stress: Not on file   Social Connections: Not on file       Functional Status:  Prior to admission patient needed assistance:   Dependent ADLs::  (Pt was indep with all mobility and adl's prior to current hospitalization)  Dependent IADLs::  (Pt and roommate shared responsibility for completion of IADL's)       Mental Health Status:  Mental Health Status: No Current Concerns       Chemical Dependency Status:  Chemical Dependency Status: No Current Concerns             Values/Beliefs:  Spiritual, Cultural Beliefs, Islam Practices, Values that affect care: no               Additional Information:  SW received MD referral to see pt for insurance/financial concerns.    CAREY met with pt and pt's significant other (Juan C) and introduced role of SW.    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  Pt was in [place] this weekend visiting friends.  Pt lives (renting) in a house in [place] and is renting from a friend.  There are 1-2 steps to enter the home.  Pt's is staying in the basement level of the home. There are an estimated 10 steps (with a handrail and a landing 1/2 down) to the basement.  Pt reports that prior to current hospitalization she was indep with all mobility (no assistive device, 1 fall in the past year which resulted in current hospitalization), adl's  and with completion of laundry tasks.  Pt and roommate share responsibility for completion of housekeeping, meal preparation and shopping tasks.  Pt drives.  Pt was not on any prescription medications.  Pt manages her own finances.  Pt does not own any DME.  Pt was not receiving skilled home care services. Pt does not have a known .  Pt was indep with financial mgnt.  Pt states that she does not have a primary care physician and does not want to establish one at this time as she is planning to return to her home of [place] in early 2023.  Pt states that she see's a GYN annually.  Pt states that she does not have a health care directive, does not have a Samaritan or spiritual practice that is important to her and does  not have a health care directive.  Pt does not have a MI/CD history or current concerns related to MI/CD.  Pt states that she is a student from [place] in the [place]  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  Pt states that she defended her thesis last week and now needs to turn it in.   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   Pt states that her current insurance is good through 12/31/2023 ([employer]), however she has copay's and deductibles and states that she cannot afford to pay copay's/deductibles.  Pt states that she will be returning home  to [place] the end of January or beginning of February 2024.  Pt support system includes:  - significant other (Juan C), is currently living in [place]  while he completes his Masters Degree  - parents, reside in [place]  - 2 siblings, 1 residing in [place] and 1 residing in [place].      Pt and Juan C states that they are hopeful that pt will be discharged from the hospital today, sooner rather than later so that pt does not incur add'l bills.    Pt would like to speak with the [employer] Financial Counselor this am to rule out whether or not there is assistance that she could receive to assist her with insurance copay's and  deductibles.  At 8:35am today, SW left voices mails for [employer] Financial Counselor's (Harsha and Gill) informing them of pt's stated need/concern as it relates to insurance copay's and deductibles and requesting that a representative address this matter with pt today as pt is hopeful to discharge to home sometime this am.    No further SW intervention planned at this time.   SW available should add'l needs arise.      SHIMA Welsh  Social Work, 6A  Phone:  [phone]  Pager:  [phone]  12/11/2023       BHUPINDER Palacio

## 2023-12-11 NOTE — PLAN OF CARE
Goal Outcome Evaluation:      Plan of Care Reviewed With: patient    Overall Patient Progress: improvingOverall Patient Progress: improving    Outcome Evaluation: Pt anticipates home today with support from her significant other    SHIMA Welsh  Social Work, 6A  Phone:  [phone]  Pager:  [phone]  12/11/2023          Bilobed Flap Text: The defect edges were debeveled with a #15 scalpel blade.  Given the location of the defect and the proximity to free margins a bilobe flap was deemed most appropriate.  Using a sterile surgical marker, an appropriate bilobe flap drawn around the defect.    The area thus outlined was incised deep to adipose tissue with a #15 scalpel blade.  The skin margins were undermined to an appropriate distance in all directions utilizing iris scissors.

## 2023-12-15 NOTE — ANESTHESIA POSTPROCEDURE EVALUATION
Patient: Lianna Fragoso    Procedure: Procedure(s):  Open reduction internal fixation mandible       Anesthesia Type:  General    Note:  Disposition: Inpatient   Postop Pain Control: Uneventful            Sign Out: Well controlled pain   PONV: No   Neuro/Psych: Uneventful            Sign Out: Acceptable/Baseline neuro status   Airway/Respiratory: Uneventful            Sign Out: Acceptable/Baseline resp. status   CV/Hemodynamics: Uneventful            Sign Out: Acceptable CV status; No obvious hypovolemia; No obvious fluid overload   Other NRE:    DID A NON-ROUTINE EVENT OCCUR?            Last vitals:  Vitals Value Taken Time   /94 12/10/23 1703   Temp 36.4  C (97.6  F) 12/10/23 1703   Pulse 61 12/10/23 1703   Resp 12 12/10/23 1703   SpO2 99 % 12/10/23 1703       Electronically Signed By: Danna Nicole MD  December 15, 2023  7:05 AM

## 2023-12-31 ENCOUNTER — HEALTH MAINTENANCE LETTER (OUTPATIENT)
Age: 28
End: 2023-12-31

## 2025-01-19 ENCOUNTER — HEALTH MAINTENANCE LETTER (OUTPATIENT)
Age: 30
End: 2025-01-19

## (undated) DEVICE — BATTERY PACK FOR POWERED DRIVER 05.000.007.01S

## (undated) DEVICE — PAD CHUX UNDERPAD 23X24" 7136

## (undated) DEVICE — Device

## (undated) DEVICE — SU PROLENE 5-0 PS-3 18" 8681G

## (undated) DEVICE — IMPLANTABLE DEVICE
Type: IMPLANTABLE DEVICE | Site: MAXILLA | Status: NON-FUNCTIONAL
Removed: 2023-12-10

## (undated) DEVICE — SOL WATER IRRIG 1000ML BOTTLE 2F7114

## (undated) DEVICE — LINEN TOWEL PACK X5 5464

## (undated) DEVICE — GLOVE BIOGEL PI SZ 8.5 40885

## (undated) DEVICE — SU CHROMIC 3-0 FS-2 27" 636

## (undated) DEVICE — IMPLANTABLE DEVICE: Type: IMPLANTABLE DEVICE | Site: MAXILLA | Status: NON-FUNCTIONAL

## (undated) DEVICE — DRSG JAWBRA  95

## (undated) DEVICE — SUCTION MANIFOLD NEPTUNE 2 SYS 4 PORT 0702-020-000

## (undated) DEVICE — PACK NEURO MINOR UMMC SNE32MNMU4

## (undated) DEVICE — ESU ELEC NDL 1" COATED/INSULATED E1465

## (undated) DEVICE — SOL NACL 0.9% IRRIG 1000ML BOTTLE 2F7124

## (undated) DEVICE — BIT DRL 125MM 1.5MM MATRIXMANDIBLE JLTCH CPLNG CLBRT STRYK

## (undated) DEVICE — SU VICRYL 3-0 X-1 27" UND J458H

## (undated) DEVICE — PREP SKIN SCRUB TRAY 4461A

## (undated) DEVICE — ESU GROUND PAD ADULT W/CORD E7507

## (undated) DEVICE — LINEN TOWEL PACK X6 WHITE 5487

## (undated) DEVICE — TOOTHBRUSH ADULT NON STERILE MDS136850

## (undated) DEVICE — SU VICRYL 4-0 P-3 18" UND  J494H

## (undated) RX ORDER — HYDROMORPHONE HCL IN WATER/PF 6 MG/30 ML
PATIENT CONTROLLED ANALGESIA SYRINGE INTRAVENOUS
Status: DISPENSED
Start: 2023-12-10

## (undated) RX ORDER — CHLORHEXIDINE GLUCONATE ORAL RINSE 1.2 MG/ML
SOLUTION DENTAL
Status: DISPENSED
Start: 2023-12-10

## (undated) RX ORDER — HYDROMORPHONE HYDROCHLORIDE 1 MG/ML
INJECTION, SOLUTION INTRAMUSCULAR; INTRAVENOUS; SUBCUTANEOUS
Status: DISPENSED
Start: 2023-12-10

## (undated) RX ORDER — FENTANYL CITRATE 50 UG/ML
INJECTION, SOLUTION INTRAMUSCULAR; INTRAVENOUS
Status: DISPENSED
Start: 2023-12-10